# Patient Record
Sex: MALE | Race: WHITE | Employment: FULL TIME | URBAN - METROPOLITAN AREA
[De-identification: names, ages, dates, MRNs, and addresses within clinical notes are randomized per-mention and may not be internally consistent; named-entity substitution may affect disease eponyms.]

---

## 2020-11-24 ENCOUNTER — OFFICE VISIT (OUTPATIENT)
Dept: PRIMARY CARE CLINIC | Age: 25
End: 2020-11-24

## 2020-11-24 VITALS
TEMPERATURE: 98.4 F | RESPIRATION RATE: 18 BRPM | OXYGEN SATURATION: 96 % | HEIGHT: 67 IN | SYSTOLIC BLOOD PRESSURE: 110 MMHG | HEART RATE: 71 BPM | WEIGHT: 240 LBS | DIASTOLIC BLOOD PRESSURE: 72 MMHG | BODY MASS INDEX: 37.67 KG/M2

## 2020-11-24 DIAGNOSIS — Z20.822 EXPOSURE TO COVID-19 VIRUS: ICD-10-CM

## 2020-11-24 PROCEDURE — 99212 OFFICE O/P EST SF 10 MIN: CPT | Performed by: INTERNAL MEDICINE

## 2020-11-24 NOTE — PROGRESS NOTES
Chief Complaint   Headache (off and on x 2 days / girlfriend tested positive )    History of Present Illness   Source of history provided by:  patient. Marie Ann is a 26 y.o. old male who has a past medical history of:   Past Medical History:   Diagnosis Date    Asthma     Presents to the flu clinic with complaints of a sinus pressure x 3 days. States symptoms have been constant since onset. Denies SOB, cough of fever. Denies any CP, dyspnea, LE edema, abdominal pain, vomiting, rash, or lethargy. No OTC treatments. Denies any hx of asthma, COPD, or tobacco use. Denies any history of international travel in the past 14 days. Positive contact with individuals with known COVID-19 infection. Patient's girlfriend was tested for COVID on 11/18 and got a positive result on Sunday 11/22. Patient was tested on 11/22 and received a negative result from [facility]. He then developed a headache and sinus pressure and presents today for repeat testing. ROS   Pertinent positives and negatives are stated within HPI, all other systems reviewed and are negative. Past Surgical History:   Procedure Laterality Date    TONSILLECTOMY     Social History:  reports that he has never smoked. He has never used smokeless tobacco. He reports that he does not drink alcohol or use drugs. Family History: family history is not on file. Allergies: Patient has no known allergies. Physical Exam      VS:  /72   Pulse 71   Temp 98.4 °F (36.9 °C) (Temporal)   Resp 18   Ht 5' 7\" (1.702 m)   Wt 240 lb (108.9 kg)   SpO2 96%   BMI 37.59 kg/m²    Oxygen Saturation Interpretation: Normal.    Constitutional:  Alert, development consistent with age. NAD. Head:  NC/NT. Airway patent. Tenderness to palpation over maxillary and frontal sinuses. Ears: TMs normal bilaterally. Canals without exudate or swelling bilaterally. Mouth: Posterior pharynx with mild erythema and clear postnasal drip. no tonsillar hypertrophy or exudate. Neck:  Normal ROM. Supple. Lungs: CTAB without wheezes, rales, or rhonchi. CV:  Regular rate and rhythm, normal heart sounds, without pathological murmurs, ectopy, gallops, or rubs. Skin:  Normal turgor. Warm, dry, without visible rash. Lymphatic: No lymphangitis or adenopathy noted. Neurological:  Oriented. Motor functions intact. Lab / Imaging Results   (All laboratory and radiology results have been personally reviewed by myself)  Labs:  No results found for this visit on 11/24/20. Imaging: All Radiology results interpreted by Radiologist unless otherwise noted. No orders to display       Medical Decision Making   Pt non-toxic and stable at time of discharge. Assessment      1. Exposure to COVID-19 virus        Plan     Disposition:  Disposition: Discharge to home    COVID-19 swab obtained and pending, will call with results once available. Advised strict self-quarantine at home in the interim. Pt should remain out of work for at least 10 days from the start of symptoms. Pt should also be fever free for 24 hours and symptoms should be improved overall prior to returning to work. Work excuse provided to patient today. Increase fluids and rest. Symptomatic relief discussed including Tylenol prn pain/fever. Schedule virtual f/u with PCP in 7-10 days if symptoms persist. ED sooner if symptoms worsen or change. ED immediately with high or refractory fever, progressive SOB, dyspnea, CP, calf pain/swelling, shaking chills, vomiting, abdominal pain, lethargy, flank pain, or decreased urinary output. Pt states understanding and is in agreement with this care plan. All questions answered. This visit was provided as a focused evaluation during the COVID -19 pandemic/national emergency. A comprehensive review of all previous patient history and testing was not conducted. Pertinent findings were elicited during the visit.      Hafsa Woods DO  11/24/2020  12:35 PM

## 2020-11-26 LAB
SARS-COV-2: NOT DETECTED
SOURCE: NORMAL

## 2021-01-04 ENCOUNTER — OFFICE VISIT (OUTPATIENT)
Dept: PRIMARY CARE CLINIC | Age: 26
End: 2021-01-04
Payer: OTHER GOVERNMENT

## 2021-01-04 VITALS
OXYGEN SATURATION: 98 % | WEIGHT: 240 LBS | HEIGHT: 67 IN | BODY MASS INDEX: 37.67 KG/M2 | SYSTOLIC BLOOD PRESSURE: 116 MMHG | HEART RATE: 89 BPM | DIASTOLIC BLOOD PRESSURE: 74 MMHG | RESPIRATION RATE: 16 BRPM | TEMPERATURE: 97.4 F

## 2021-01-04 DIAGNOSIS — U07.1 COVID-19: Primary | ICD-10-CM

## 2021-01-04 LAB
Lab: ABNORMAL
QC PASS/FAIL: ABNORMAL
SARS-COV-2, POC: DETECTED

## 2021-01-04 PROCEDURE — 87426 SARSCOV CORONAVIRUS AG IA: CPT | Performed by: NURSE PRACTITIONER

## 2021-01-04 PROCEDURE — 99213 OFFICE O/P EST LOW 20 MIN: CPT | Performed by: NURSE PRACTITIONER

## 2021-01-04 RX ORDER — AZITHROMYCIN 250 MG/1
250 TABLET, FILM COATED ORAL DAILY
Qty: 6 TABLET | Refills: 0 | Status: SHIPPED
Start: 2021-01-04 | End: 2021-12-07

## 2021-01-04 RX ORDER — ZINC SULFATE 50(220)MG
50 CAPSULE ORAL DAILY
Qty: 7 CAPSULE | Refills: 0 | Status: SHIPPED
Start: 2021-01-04 | End: 2021-12-14

## 2021-01-04 NOTE — PROGRESS NOTES
MG tablet, Take 1 tablet by mouth 2 times daily for 7 days, Disp: 14 tablet, Rfl: 0    Allergies:   No Known Allergies    Social History:     Social History     Tobacco Use    Smoking status: Never Smoker    Smokeless tobacco: Never Used   Substance Use Topics    Alcohol use: No    Drug use: No       Patient lives at home. Physical Exam:     Vitals:    01/04/21 1317   BP: 116/74   Pulse: 89   Resp: 16   Temp: 97.4 °F (36.3 °C)   TempSrc: Temporal   SpO2: 98%   Weight: 240 lb (108.9 kg)   Height: 5' 7\" (1.702 m)       Physical Exam (PE)    Physical Exam  Constitutional:       Appearance: Normal appearance. HENT:      Head: Normocephalic. Right Ear: Tympanic membrane, ear canal and external ear normal.      Left Ear: Tympanic membrane, ear canal and external ear normal.      Nose: Congestion and rhinorrhea present. Mouth/Throat:      Mouth: Mucous membranes are moist.      Pharynx: Oropharynx is clear. Eyes:      Pupils: Pupils are equal, round, and reactive to light. Neck:      Musculoskeletal: Normal range of motion and neck supple. Cardiovascular:      Rate and Rhythm: Normal rate and regular rhythm. Pulses: Normal pulses. Heart sounds: Normal heart sounds. Pulmonary:      Effort: Pulmonary effort is normal.      Breath sounds: Normal breath sounds. No wheezing, rhonchi or rales. Abdominal:      General: Bowel sounds are normal.      Palpations: Abdomen is soft. Musculoskeletal: Normal range of motion. Lymphadenopathy:      Cervical: No cervical adenopathy. Skin:     General: Skin is warm and dry. Capillary Refill: Capillary refill takes less than 2 seconds. Neurological:      General: No focal deficit present. Mental Status: He is alert and oriented to person, place, and time.    Psychiatric:         Mood and Affect: Mood normal.         Behavior: Behavior normal.          Testing:   (All laboratory and radiology results have been personally reviewed by myself)  Labs:  Results for orders placed or performed in visit on 01/04/21   POCT COVID-19, Antigen   Result Value Ref Range    SARS-COV-2, POC Detected Not Detected    Lot Number [id number]     QC Pass/Fail pass        Imaging: All Radiology results interpreted by Radiologist unless otherwise noted. No orders to display       Assessment / Plan:   The patient's vitals, allergies, medications, and past medical history have been reviewed. Linwood Benjamin was seen today for nasal congestion, generalized body aches and headache. Diagnoses and all orders for this visit:    COVID-19  -     POCT COVID-19, Antigen  -     azithromycin (ZITHROMAX Z-CARL) 250 MG tablet; Take 1 tablet by mouth daily Take 2 tabs on day one, then 1 tab daily for the next 4 days  -     Ascorbic Acid (VITAMIN C) 500 MG CHEW; Take 1 tablet by mouth 2 times daily for 7 days  -     zinc sulfate (ZINC-220) 220 (50 Zn) MG capsule; Take 1 capsule by mouth daily for 7 days        - Discussed the benefits of daily vitamin C 1 g and zinc 50 mg for immune support. Discussed the use of Robitussin-DM as needed for cough and congestion.    -  Advised cautionary self-quarantine at home in the interim per CDC guidelines. Increase fluids and rest. Symptomatic relief discussed including Tylenol prn pain/fever. Schedule f/u with PCP in 2-3 days. Red flag symptoms were discussed with the patient today. The patient is directed to go the ED if symptoms worsen or change. Pt verbalizes understanding and is in agreement with plan of care. All questions answered. SIGNATURE: EMILY Almodovar-CNP    This visit was provided as a focused evaluation during the COVID -19 pandemic/national emergency. A comprehensive review of all previous patient history and testing was not conducted. Pertinent findings were elicited during the visit.

## 2021-01-04 NOTE — PATIENT INSTRUCTIONS
Patient Education        Learning About Coronavirus [phone])  Coronavirus [phone]): Overview  What is coronavirus ([id number])? The coronavirus disease (COVID-19) is caused by a virus. It is an illness that was first found in December 2019. It has since spread worldwide. The virus can cause fever, cough, and trouble breathing. In severe cases, it can cause pneumonia and make it hard to breathe without help. It can cause death. This virus spreads person-to-person through droplets from coughing and sneezing. It can also spread when you are close to someone who is infected. And it can spread when you touch something that has the virus on it, such as a doorknob or a tabletop. Coronaviruses are a large group of viruses. They cause the common cold. They also cause more serious illnesses like [place] respiratory syndrome (MERS) and severe acute respiratory syndrome (SARS). COVID-19 is caused by a novel coronavirus. That means it's a new type that has not been seen in people before. How is COVID-19 treated? Mild illness can be treated at home, but more serious illness needs to be treated in the hospital. Treatment may include medicines to reduce symptoms, plus breathing support such as oxygen therapy or a ventilator. Other treatments, such as antiviral medicines, may help people who have COVID-19. What can you do to protect yourself from COVID-19? The best way to protect yourself from getting sick is to:  · Avoid areas where there is an outbreak. · Avoid contact with people who may be infected. · Avoid crowds and try to stay at least 6 feet away from other people. · Wash your hands often, especially after you cough or sneeze. Use soap and water, and scrub for at least 20 seconds. If soap and water aren't available, use an alcohol-based hand . · Avoid touching your mouth, nose, and eyes. What can you do to avoid spreading the virus to others?   To help avoid spreading the virus to others:  · [employer] your hands often with soap or alcohol-based hand sanitizers. · Cover your mouth with a tissue when you cough or sneeze. Then throw the tissue in the trash. · Use a disinfectant to clean things that you touch often. These include doorknobs, remote controls, phones, and handles on your refrigerator and microwave. And don't forget countertops, tabletops, bathrooms, and computer keyboards. · Wear a cloth face cover if you have to go to public areas. If you know or suspect that you have COVID-19:  · Stay home. Don't go to school, work, or public areas. And don't use public transportation, ride-shares, or taxis unless you have no choice. · Leave your home only if you need to get medical care or testing. But call the doctor's office first so they know you're coming. And wear a face cover. · Limit contact with people in your home. If possible, stay in a separate bedroom and use a separate bathroom. · Wear a face cover whenever you're around other people. It can help stop the spread of the virus when you cough or sneeze. · Clean and disinfect your home every day. Use household  and disinfectant wipes or sprays. Take special care to clean things that you grab with your hands. · Self-isolate until it's safe to be around others again. ? If you have symptoms, it's safe when you haven't had a fever for 3 days and your symptoms have improved and it's been at least 10 days since your symptoms started. ? If you were exposed to the virus but don't have symptoms, it's safe to be around others 14 days after exposure. ? Talk to your doctor about whether you also need testing, especially if you have a weakened immune system. When to call for help  Call 911 anytime you think you may need emergency care. For example, call if:  · You have severe trouble breathing. (You can't talk at all.)  · You have constant chest pain or pressure. · You are severely dizzy or lightheaded.   · You are confused or can't think

## 2021-12-07 ENCOUNTER — ANESTHESIA EVENT (OUTPATIENT)
Dept: OPERATING ROOM | Age: 26
DRG: 906 | End: 2021-12-07
Payer: COMMERCIAL

## 2021-12-07 ENCOUNTER — HOSPITAL ENCOUNTER (INPATIENT)
Age: 26
LOS: 2 days | Discharge: HOME HEALTH | DRG: 906 | End: 2021-12-10
Attending: EMERGENCY MEDICINE | Admitting: ORTHOPAEDIC SURGERY
Payer: COMMERCIAL

## 2021-12-07 ENCOUNTER — APPOINTMENT (OUTPATIENT)
Dept: GENERAL RADIOLOGY | Age: 26
DRG: 906 | End: 2021-12-07
Payer: COMMERCIAL

## 2021-12-07 ENCOUNTER — ANESTHESIA (OUTPATIENT)
Dept: OPERATING ROOM | Age: 26
DRG: 906 | End: 2021-12-07
Payer: COMMERCIAL

## 2021-12-07 DIAGNOSIS — S68.011A AMPUTATION OF RIGHT THUMB WITH COMPLICATION, INITIAL ENCOUNTER: Primary | ICD-10-CM

## 2021-12-07 PROCEDURE — 3600000014 HC SURGERY LEVEL 4 ADDTL 15MIN: Performed by: ORTHOPAEDIC SURGERY

## 2021-12-07 PROCEDURE — 2580000003 HC RX 258: Performed by: ORTHOPAEDIC SURGERY

## 2021-12-07 PROCEDURE — 6360000002 HC RX W HCPCS: Performed by: EMERGENCY MEDICINE

## 2021-12-07 PROCEDURE — 90471 IMMUNIZATION ADMIN: CPT | Performed by: EMERGENCY MEDICINE

## 2021-12-07 PROCEDURE — 0X6L0Z0 DETACHMENT AT RIGHT THUMB, COMPLETE, OPEN APPROACH: ICD-10-PCS | Performed by: ORTHOPAEDIC SURGERY

## 2021-12-07 PROCEDURE — 2500000003 HC RX 250 WO HCPCS

## 2021-12-07 PROCEDURE — 3700000001 HC ADD 15 MINUTES (ANESTHESIA): Performed by: ORTHOPAEDIC SURGERY

## 2021-12-07 PROCEDURE — 2709999900 HC NON-CHARGEABLE SUPPLY: Performed by: ORTHOPAEDIC SURGERY

## 2021-12-07 PROCEDURE — 73130 X-RAY EXAM OF HAND: CPT

## 2021-12-07 PROCEDURE — 6360000002 HC RX W HCPCS

## 2021-12-07 PROCEDURE — 3600000004 HC SURGERY LEVEL 4 BASE: Performed by: ORTHOPAEDIC SURGERY

## 2021-12-07 PROCEDURE — 99222 1ST HOSP IP/OBS MODERATE 55: CPT | Performed by: ORTHOPAEDIC SURGERY

## 2021-12-07 PROCEDURE — 6360000002 HC RX W HCPCS: Performed by: ORTHOPAEDIC SURGERY

## 2021-12-07 PROCEDURE — 0PSR0ZZ REPOSITION RIGHT THUMB PHALANX, OPEN APPROACH: ICD-10-PCS | Performed by: ORTHOPAEDIC SURGERY

## 2021-12-07 PROCEDURE — 2580000003 HC RX 258: Performed by: EMERGENCY MEDICINE

## 2021-12-07 PROCEDURE — 3700000000 HC ANESTHESIA ATTENDED CARE: Performed by: ORTHOPAEDIC SURGERY

## 2021-12-07 PROCEDURE — 2580000003 HC RX 258

## 2021-12-07 PROCEDURE — 99283 EMERGENCY DEPT VISIT LOW MDM: CPT

## 2021-12-07 PROCEDURE — 90714 TD VACC NO PRESV 7 YRS+ IM: CPT | Performed by: EMERGENCY MEDICINE

## 2021-12-07 DEVICE — K WIRE FIX L6IN DIA0.045IN 1600645] MICROAIRE SURGICAL INSTRUMENTS INC]: Type: IMPLANTABLE DEVICE | Site: FINGERS | Status: FUNCTIONAL

## 2021-12-07 RX ORDER — DEXAMETHASONE SODIUM PHOSPHATE 10 MG/ML
INJECTION INTRAMUSCULAR; INTRAVENOUS PRN
Status: DISCONTINUED | OUTPATIENT
Start: 2021-12-07 | End: 2021-12-08 | Stop reason: SDUPTHER

## 2021-12-07 RX ORDER — ACETAMINOPHEN 650 MG
TABLET, EXTENDED RELEASE ORAL PRN
Status: DISCONTINUED | OUTPATIENT
Start: 2021-12-07 | End: 2021-12-08 | Stop reason: ALTCHOICE

## 2021-12-07 RX ORDER — PROMETHAZINE HYDROCHLORIDE 25 MG/ML
6.25 INJECTION, SOLUTION INTRAMUSCULAR; INTRAVENOUS
Status: ACTIVE | OUTPATIENT
Start: 2021-12-07 | End: 2021-12-07

## 2021-12-07 RX ORDER — CEFAZOLIN SODIUM 1 G/3ML
INJECTION, POWDER, FOR SOLUTION INTRAMUSCULAR; INTRAVENOUS PRN
Status: DISCONTINUED | OUTPATIENT
Start: 2021-12-07 | End: 2021-12-08 | Stop reason: SDUPTHER

## 2021-12-07 RX ORDER — TETANUS AND DIPHTHERIA TOXOIDS ADSORBED 2; 2 [LF]/.5ML; [LF]/.5ML
0.5 INJECTION INTRAMUSCULAR ONCE
Status: COMPLETED | OUTPATIENT
Start: 2021-12-07 | End: 2021-12-07

## 2021-12-07 RX ORDER — 0.9 % SODIUM CHLORIDE 0.9 %
1000 INTRAVENOUS SOLUTION INTRAVENOUS ONCE
Status: COMPLETED | OUTPATIENT
Start: 2021-12-07 | End: 2021-12-07

## 2021-12-07 RX ORDER — LABETALOL HYDROCHLORIDE 5 MG/ML
5 INJECTION, SOLUTION INTRAVENOUS EVERY 10 MIN PRN
Status: DISCONTINUED | OUTPATIENT
Start: 2021-12-07 | End: 2021-12-08 | Stop reason: HOSPADM

## 2021-12-07 RX ORDER — LIDOCAINE HYDROCHLORIDE 20 MG/ML
INJECTION, SOLUTION INTRAVENOUS PRN
Status: DISCONTINUED | OUTPATIENT
Start: 2021-12-07 | End: 2021-12-08 | Stop reason: SDUPTHER

## 2021-12-07 RX ORDER — HYDRALAZINE HYDROCHLORIDE 20 MG/ML
5 INJECTION INTRAMUSCULAR; INTRAVENOUS EVERY 10 MIN PRN
Status: DISCONTINUED | OUTPATIENT
Start: 2021-12-07 | End: 2021-12-08 | Stop reason: HOSPADM

## 2021-12-07 RX ORDER — PROPOFOL 10 MG/ML
INJECTION, EMULSION INTRAVENOUS PRN
Status: DISCONTINUED | OUTPATIENT
Start: 2021-12-07 | End: 2021-12-08 | Stop reason: SDUPTHER

## 2021-12-07 RX ORDER — FENTANYL CITRATE 50 UG/ML
INJECTION, SOLUTION INTRAMUSCULAR; INTRAVENOUS PRN
Status: DISCONTINUED | OUTPATIENT
Start: 2021-12-07 | End: 2021-12-08 | Stop reason: SDUPTHER

## 2021-12-07 RX ORDER — ROCURONIUM BROMIDE 10 MG/ML
INJECTION, SOLUTION INTRAVENOUS PRN
Status: DISCONTINUED | OUTPATIENT
Start: 2021-12-07 | End: 2021-12-08 | Stop reason: SDUPTHER

## 2021-12-07 RX ORDER — MIDAZOLAM HYDROCHLORIDE 1 MG/ML
INJECTION INTRAMUSCULAR; INTRAVENOUS PRN
Status: DISCONTINUED | OUTPATIENT
Start: 2021-12-07 | End: 2021-12-08 | Stop reason: SDUPTHER

## 2021-12-07 RX ORDER — SUCCINYLCHOLINE/SOD CL,ISO/PF 200MG/10ML
SYRINGE (ML) INTRAVENOUS PRN
Status: DISCONTINUED | OUTPATIENT
Start: 2021-12-07 | End: 2021-12-08 | Stop reason: SDUPTHER

## 2021-12-07 RX ORDER — MEPERIDINE HYDROCHLORIDE 25 MG/ML
12.5 INJECTION INTRAMUSCULAR; INTRAVENOUS; SUBCUTANEOUS EVERY 5 MIN PRN
Status: DISCONTINUED | OUTPATIENT
Start: 2021-12-07 | End: 2021-12-08 | Stop reason: HOSPADM

## 2021-12-07 RX ORDER — SODIUM CHLORIDE 9 MG/ML
INJECTION, SOLUTION INTRAVENOUS CONTINUOUS PRN
Status: DISCONTINUED | OUTPATIENT
Start: 2021-12-07 | End: 2021-12-08 | Stop reason: SDUPTHER

## 2021-12-07 RX ADMIN — FENTANYL CITRATE 150 MCG: 50 INJECTION, SOLUTION INTRAMUSCULAR; INTRAVENOUS at 21:51

## 2021-12-07 RX ADMIN — FENTANYL CITRATE 100 MCG: 50 INJECTION, SOLUTION INTRAMUSCULAR; INTRAVENOUS at 21:44

## 2021-12-07 RX ADMIN — LIDOCAINE HYDROCHLORIDE 100 MG: 20 INJECTION, SOLUTION INTRAVENOUS at 21:44

## 2021-12-07 RX ADMIN — MIDAZOLAM 2 MG: 1 INJECTION INTRAMUSCULAR; INTRAVENOUS at 21:41

## 2021-12-07 RX ADMIN — CEFAZOLIN 2000 MG: 1 INJECTION, POWDER, FOR SOLUTION INTRAMUSCULAR; INTRAVENOUS at 21:51

## 2021-12-07 RX ADMIN — DEXAMETHASONE SODIUM PHOSPHATE 10 MG: 10 INJECTION INTRAMUSCULAR; INTRAVENOUS at 21:44

## 2021-12-07 RX ADMIN — Medication 160 MG: at 21:44

## 2021-12-07 RX ADMIN — PROPOFOL 150 MG: 10 INJECTION, EMULSION INTRAVENOUS at 21:44

## 2021-12-07 RX ADMIN — SODIUM CHLORIDE 1000 ML: 9 INJECTION, SOLUTION INTRAVENOUS at 21:00

## 2021-12-07 RX ADMIN — TETANUS AND DIPHTHERIA TOXOIDS ADSORBED 0.5 ML: 2; 2 INJECTION INTRAMUSCULAR at 20:31

## 2021-12-07 RX ADMIN — SODIUM CHLORIDE: 9 INJECTION, SOLUTION INTRAVENOUS at 21:41

## 2021-12-07 RX ADMIN — ROCURONIUM BROMIDE 5 MG: 10 INJECTION, SOLUTION INTRAVENOUS at 21:44

## 2021-12-07 RX ADMIN — ROCURONIUM BROMIDE 30 MG: 10 INJECTION, SOLUTION INTRAVENOUS at 21:52

## 2021-12-07 ASSESSMENT — PULMONARY FUNCTION TESTS
PIF_VALUE: 20
PIF_VALUE: 21
PIF_VALUE: 20
PIF_VALUE: 21
PIF_VALUE: 20
PIF_VALUE: 20
PIF_VALUE: 22
PIF_VALUE: 21
PIF_VALUE: 21
PIF_VALUE: 20
PIF_VALUE: 20
PIF_VALUE: 21
PIF_VALUE: 20
PIF_VALUE: 20
PIF_VALUE: 21
PIF_VALUE: 21
PIF_VALUE: 20
PIF_VALUE: 21
PIF_VALUE: 20
PIF_VALUE: 21
PIF_VALUE: 20
PIF_VALUE: 19
PIF_VALUE: 21
PIF_VALUE: 20
PIF_VALUE: 20
PIF_VALUE: 21
PIF_VALUE: 20
PIF_VALUE: 21
PIF_VALUE: 20
PIF_VALUE: 21
PIF_VALUE: 21
PIF_VALUE: 20
PIF_VALUE: 20
PIF_VALUE: 21
PIF_VALUE: 21
PIF_VALUE: 20
PIF_VALUE: 21
PIF_VALUE: 22
PIF_VALUE: 20
PIF_VALUE: 18
PIF_VALUE: 21
PIF_VALUE: 20
PIF_VALUE: 20
PIF_VALUE: 0
PIF_VALUE: 21
PIF_VALUE: 20
PIF_VALUE: 21
PIF_VALUE: 21
PIF_VALUE: 20
PIF_VALUE: 20
PIF_VALUE: 21
PIF_VALUE: 21
PIF_VALUE: 20
PIF_VALUE: 21
PIF_VALUE: 20
PIF_VALUE: 21
PIF_VALUE: 21
PIF_VALUE: 20
PIF_VALUE: 20
PIF_VALUE: 21
PIF_VALUE: 20
PIF_VALUE: 21
PIF_VALUE: 1
PIF_VALUE: 20
PIF_VALUE: 20
PIF_VALUE: 21
PIF_VALUE: 3
PIF_VALUE: 20
PIF_VALUE: 1
PIF_VALUE: 22
PIF_VALUE: 20
PIF_VALUE: 21
PIF_VALUE: 20
PIF_VALUE: 21
PIF_VALUE: 20
PIF_VALUE: 20
PIF_VALUE: 21
PIF_VALUE: 20
PIF_VALUE: 21
PIF_VALUE: 20
PIF_VALUE: 21
PIF_VALUE: 21
PIF_VALUE: 20
PIF_VALUE: 21
PIF_VALUE: 20
PIF_VALUE: 21
PIF_VALUE: 20
PIF_VALUE: 21
PIF_VALUE: 20
PIF_VALUE: 21
PIF_VALUE: 1
PIF_VALUE: 21
PIF_VALUE: 20
PIF_VALUE: 21

## 2021-12-07 ASSESSMENT — PAIN DESCRIPTION - FREQUENCY: FREQUENCY: CONTINUOUS

## 2021-12-07 ASSESSMENT — PAIN DESCRIPTION - PAIN TYPE: TYPE: ACUTE PAIN

## 2021-12-07 ASSESSMENT — PAIN SCALES - WONG BAKER: WONGBAKER_NUMERICALRESPONSE: 2

## 2021-12-07 ASSESSMENT — PAIN DESCRIPTION - DESCRIPTORS: DESCRIPTORS: DISCOMFORT

## 2021-12-07 ASSESSMENT — LIFESTYLE VARIABLES: SMOKING_STATUS: 0

## 2021-12-08 ENCOUNTER — APPOINTMENT (OUTPATIENT)
Dept: GENERAL RADIOLOGY | Age: 26
DRG: 906 | End: 2021-12-08
Payer: COMMERCIAL

## 2021-12-08 VITALS — DIASTOLIC BLOOD PRESSURE: 104 MMHG | TEMPERATURE: 97 F | SYSTOLIC BLOOD PRESSURE: 164 MMHG | OXYGEN SATURATION: 100 %

## 2021-12-08 PROBLEM — S68.011A: Status: ACTIVE | Noted: 2021-12-08

## 2021-12-08 LAB
ALBUMIN SERPL-MCNC: 3.6 G/DL (ref 3.5–5.2)
ALP BLD-CCNC: 46 U/L (ref 40–129)
ALT SERPL-CCNC: 19 U/L (ref 0–40)
ANION GAP SERPL CALCULATED.3IONS-SCNC: 14 MMOL/L (ref 7–16)
APTT: 44.2 SEC (ref 24.5–35.1)
APTT: 46.7 SEC (ref 24.5–35.1)
APTT: 61.7 SEC (ref 24.5–35.1)
APTT: >240 SEC (ref 24.5–35.1)
AST SERPL-CCNC: 14 U/L (ref 0–39)
BILIRUB SERPL-MCNC: 0.7 MG/DL (ref 0–1.2)
BUN BLDV-MCNC: 10 MG/DL (ref 6–20)
CALCIUM IONIZED: 1.26 MMOL/L (ref 1.15–1.33)
CALCIUM SERPL-MCNC: 8.3 MG/DL (ref 8.6–10.2)
CHLORIDE BLD-SCNC: 106 MMOL/L (ref 98–107)
CO2: 18 MMOL/L (ref 22–29)
CREAT SERPL-MCNC: 0.7 MG/DL (ref 0.7–1.2)
GFR AFRICAN AMERICAN: >60
GFR NON-AFRICAN AMERICAN: >60 ML/MIN/1.73
GLUCOSE BLD-MCNC: 133 MG/DL (ref 74–99)
HCT VFR BLD CALC: 36.1 % (ref 37–54)
HEMOGLOBIN: 12.6 G/DL (ref 12.5–16.5)
MAGNESIUM: 1.6 MG/DL (ref 1.6–2.6)
MCH RBC QN AUTO: 30.4 PG (ref 26–35)
MCHC RBC AUTO-ENTMCNC: 34.9 % (ref 32–34.5)
MCV RBC AUTO: 87.2 FL (ref 80–99.9)
PDW BLD-RTO: 12 FL (ref 11.5–15)
PHOSPHORUS: 1.8 MG/DL (ref 2.5–4.5)
PLATELET # BLD: 239 E9/L (ref 130–450)
PMV BLD AUTO: 9.8 FL (ref 7–12)
POTASSIUM SERPL-SCNC: 3.6 MMOL/L (ref 3.5–5)
RBC # BLD: 4.14 E12/L (ref 3.8–5.8)
REASON FOR REJECTION: NORMAL
REASON FOR REJECTION: NORMAL
REJECTED TEST: NORMAL
REJECTED TEST: NORMAL
SARS-COV-2, NAAT: NOT DETECTED
SODIUM BLD-SCNC: 138 MMOL/L (ref 132–146)
TOTAL PROTEIN: 6.2 G/DL (ref 6.4–8.3)
WBC # BLD: 12.3 E9/L (ref 4.5–11.5)

## 2021-12-08 PROCEDURE — 84100 ASSAY OF PHOSPHORUS: CPT

## 2021-12-08 PROCEDURE — 6360000002 HC RX W HCPCS

## 2021-12-08 PROCEDURE — 6360000002 HC RX W HCPCS: Performed by: STUDENT IN AN ORGANIZED HEALTH CARE EDUCATION/TRAINING PROGRAM

## 2021-12-08 PROCEDURE — 20827 REPLANTATION THUMB COMPLETE: CPT | Performed by: ORTHOPAEDIC SURGERY

## 2021-12-08 PROCEDURE — 7100000001 HC PACU RECOVERY - ADDTL 15 MIN

## 2021-12-08 PROCEDURE — 36415 COLL VENOUS BLD VENIPUNCTURE: CPT

## 2021-12-08 PROCEDURE — 7100000000 HC PACU RECOVERY - FIRST 15 MIN

## 2021-12-08 PROCEDURE — 87635 SARS-COV-2 COVID-19 AMP PRB: CPT

## 2021-12-08 PROCEDURE — 82330 ASSAY OF CALCIUM: CPT

## 2021-12-08 PROCEDURE — 2500000003 HC RX 250 WO HCPCS: Performed by: STUDENT IN AN ORGANIZED HEALTH CARE EDUCATION/TRAINING PROGRAM

## 2021-12-08 PROCEDURE — 80053 COMPREHEN METABOLIC PANEL: CPT

## 2021-12-08 PROCEDURE — 6360000002 HC RX W HCPCS: Performed by: ANESTHESIOLOGIST ASSISTANT

## 2021-12-08 PROCEDURE — 85027 COMPLETE CBC AUTOMATED: CPT

## 2021-12-08 PROCEDURE — 85730 THROMBOPLASTIN TIME PARTIAL: CPT

## 2021-12-08 PROCEDURE — 2580000003 HC RX 258: Performed by: STUDENT IN AN ORGANIZED HEALTH CARE EDUCATION/TRAINING PROGRAM

## 2021-12-08 PROCEDURE — 83735 ASSAY OF MAGNESIUM: CPT

## 2021-12-08 PROCEDURE — 2500000003 HC RX 250 WO HCPCS: Performed by: ANESTHESIOLOGIST ASSISTANT

## 2021-12-08 PROCEDURE — 3209999900 FLUORO FOR SURGICAL PROCEDURES

## 2021-12-08 PROCEDURE — 2000000000 HC ICU R&B

## 2021-12-08 PROCEDURE — 6370000000 HC RX 637 (ALT 250 FOR IP): Performed by: STUDENT IN AN ORGANIZED HEALTH CARE EDUCATION/TRAINING PROGRAM

## 2021-12-08 RX ORDER — ONDANSETRON 2 MG/ML
4 INJECTION INTRAMUSCULAR; INTRAVENOUS EVERY 6 HOURS PRN
Status: DISCONTINUED | OUTPATIENT
Start: 2021-12-08 | End: 2021-12-10 | Stop reason: HOSPADM

## 2021-12-08 RX ORDER — ONDANSETRON 2 MG/ML
INJECTION INTRAMUSCULAR; INTRAVENOUS
Status: COMPLETED
Start: 2021-12-08 | End: 2021-12-08

## 2021-12-08 RX ORDER — NEOSTIGMINE METHYLSULFATE 1 MG/ML
INJECTION, SOLUTION INTRAVENOUS PRN
Status: DISCONTINUED | OUTPATIENT
Start: 2021-12-08 | End: 2021-12-08 | Stop reason: SDUPTHER

## 2021-12-08 RX ORDER — OXYCODONE HYDROCHLORIDE 10 MG/1
10 TABLET ORAL EVERY 4 HOURS PRN
Status: DISCONTINUED | OUTPATIENT
Start: 2021-12-08 | End: 2021-12-10

## 2021-12-08 RX ORDER — ACETAMINOPHEN 325 MG/1
650 TABLET ORAL EVERY 6 HOURS
Status: DISCONTINUED | OUTPATIENT
Start: 2021-12-08 | End: 2021-12-09

## 2021-12-08 RX ORDER — HEPARIN SODIUM 10000 [USP'U]/100ML
5-30 INJECTION, SOLUTION INTRAVENOUS CONTINUOUS
Status: DISCONTINUED | OUTPATIENT
Start: 2021-12-08 | End: 2021-12-10

## 2021-12-08 RX ORDER — HEPARIN SODIUM 1000 [USP'U]/ML
INJECTION, SOLUTION INTRAVENOUS; SUBCUTANEOUS PRN
Status: DISCONTINUED | OUTPATIENT
Start: 2021-12-08 | End: 2021-12-08 | Stop reason: SDUPTHER

## 2021-12-08 RX ORDER — SODIUM CHLORIDE 0.9 % (FLUSH) 0.9 %
10 SYRINGE (ML) INJECTION PRN
Status: DISCONTINUED | OUTPATIENT
Start: 2021-12-08 | End: 2021-12-10 | Stop reason: HOSPADM

## 2021-12-08 RX ORDER — OXYCODONE HYDROCHLORIDE 5 MG/1
5 TABLET ORAL EVERY 4 HOURS PRN
Status: DISCONTINUED | OUTPATIENT
Start: 2021-12-08 | End: 2021-12-10

## 2021-12-08 RX ORDER — HEPARIN SODIUM 1000 [USP'U]/ML
4000 INJECTION, SOLUTION INTRAVENOUS; SUBCUTANEOUS ONCE
Status: DISCONTINUED | OUTPATIENT
Start: 2021-12-08 | End: 2021-12-08 | Stop reason: ALTCHOICE

## 2021-12-08 RX ORDER — ONDANSETRON 4 MG/1
4 TABLET, ORALLY DISINTEGRATING ORAL EVERY 8 HOURS PRN
Status: DISCONTINUED | OUTPATIENT
Start: 2021-12-08 | End: 2021-12-10 | Stop reason: HOSPADM

## 2021-12-08 RX ORDER — SODIUM CHLORIDE 9 MG/ML
25 INJECTION, SOLUTION INTRAVENOUS PRN
Status: DISCONTINUED | OUTPATIENT
Start: 2021-12-08 | End: 2021-12-10 | Stop reason: HOSPADM

## 2021-12-08 RX ORDER — POLYETHYLENE GLYCOL 3350 17 G/17G
17 POWDER, FOR SOLUTION ORAL DAILY
Status: DISCONTINUED | OUTPATIENT
Start: 2021-12-08 | End: 2021-12-09

## 2021-12-08 RX ORDER — ONDANSETRON 2 MG/ML
INJECTION INTRAMUSCULAR; INTRAVENOUS PRN
Status: DISCONTINUED | OUTPATIENT
Start: 2021-12-08 | End: 2021-12-08 | Stop reason: SDUPTHER

## 2021-12-08 RX ORDER — GLYCOPYRROLATE 1 MG/5 ML
SYRINGE (ML) INTRAVENOUS PRN
Status: DISCONTINUED | OUTPATIENT
Start: 2021-12-08 | End: 2021-12-08 | Stop reason: SDUPTHER

## 2021-12-08 RX ORDER — SODIUM CHLORIDE, SODIUM LACTATE, POTASSIUM CHLORIDE, CALCIUM CHLORIDE 600; 310; 30; 20 MG/100ML; MG/100ML; MG/100ML; MG/100ML
INJECTION, SOLUTION INTRAVENOUS CONTINUOUS
Status: DISCONTINUED | OUTPATIENT
Start: 2021-12-08 | End: 2021-12-09

## 2021-12-08 RX ORDER — HEPARIN SODIUM 1000 [USP'U]/ML
2000 INJECTION, SOLUTION INTRAVENOUS; SUBCUTANEOUS PRN
Status: DISCONTINUED | OUTPATIENT
Start: 2021-12-08 | End: 2021-12-10

## 2021-12-08 RX ORDER — MAGNESIUM SULFATE IN WATER 40 MG/ML
4000 INJECTION, SOLUTION INTRAVENOUS ONCE
Status: COMPLETED | OUTPATIENT
Start: 2021-12-08 | End: 2021-12-08

## 2021-12-08 RX ORDER — HEPARIN SODIUM 1000 [USP'U]/ML
4000 INJECTION, SOLUTION INTRAVENOUS; SUBCUTANEOUS PRN
Status: DISCONTINUED | OUTPATIENT
Start: 2021-12-08 | End: 2021-12-10

## 2021-12-08 RX ORDER — SODIUM CHLORIDE 0.9 % (FLUSH) 0.9 %
10 SYRINGE (ML) INJECTION EVERY 12 HOURS SCHEDULED
Status: DISCONTINUED | OUTPATIENT
Start: 2021-12-08 | End: 2021-12-10 | Stop reason: HOSPADM

## 2021-12-08 RX ORDER — HYDRALAZINE HYDROCHLORIDE 20 MG/ML
10 INJECTION INTRAMUSCULAR; INTRAVENOUS EVERY 30 MIN PRN
Status: DISCONTINUED | OUTPATIENT
Start: 2021-12-08 | End: 2021-12-10 | Stop reason: HOSPADM

## 2021-12-08 RX ORDER — SENNA PLUS 8.6 MG/1
1 TABLET ORAL DAILY PRN
Status: DISCONTINUED | OUTPATIENT
Start: 2021-12-08 | End: 2021-12-10 | Stop reason: HOSPADM

## 2021-12-08 RX ORDER — LABETALOL HYDROCHLORIDE 5 MG/ML
10 INJECTION, SOLUTION INTRAVENOUS EVERY 30 MIN PRN
Status: DISCONTINUED | OUTPATIENT
Start: 2021-12-08 | End: 2021-12-10 | Stop reason: HOSPADM

## 2021-12-08 RX ORDER — OXYCODONE HYDROCHLORIDE AND ACETAMINOPHEN 5; 325 MG/1; MG/1
1 TABLET ORAL EVERY 6 HOURS PRN
Qty: 28 TABLET | Refills: 0 | Status: SHIPPED | OUTPATIENT
Start: 2021-12-08 | End: 2021-12-10 | Stop reason: HOSPADM

## 2021-12-08 RX ADMIN — Medication 0.4 MG: at 00:57

## 2021-12-08 RX ADMIN — SODIUM PHOSPHATE, MONOBASIC, MONOHYDRATE 20 MMOL: 276; 142 INJECTION, SOLUTION INTRAVENOUS at 17:27

## 2021-12-08 RX ADMIN — HYDROMORPHONE HYDROCHLORIDE 0.5 MG: 1 INJECTION, SOLUTION INTRAMUSCULAR; INTRAVENOUS; SUBCUTANEOUS at 17:37

## 2021-12-08 RX ADMIN — CEFAZOLIN SODIUM 1000 MG: 1 INJECTION, POWDER, FOR SOLUTION INTRAMUSCULAR; INTRAVENOUS at 03:52

## 2021-12-08 RX ADMIN — HEPARIN SODIUM 13 UNITS/KG/HR: 10000 INJECTION, SOLUTION INTRAVENOUS at 20:20

## 2021-12-08 RX ADMIN — SODIUM CHLORIDE, POTASSIUM CHLORIDE, SODIUM LACTATE AND CALCIUM CHLORIDE: 600; 310; 30; 20 INJECTION, SOLUTION INTRAVENOUS at 01:49

## 2021-12-08 RX ADMIN — OXYCODONE HYDROCHLORIDE 10 MG: 10 TABLET ORAL at 02:39

## 2021-12-08 RX ADMIN — HEPARIN SODIUM 2000 UNITS: 1000 INJECTION INTRAVENOUS; SUBCUTANEOUS at 11:02

## 2021-12-08 RX ADMIN — MAGNESIUM SULFATE HEPTAHYDRATE 4000 MG: 40 INJECTION, SOLUTION INTRAVENOUS at 17:27

## 2021-12-08 RX ADMIN — ACETAMINOPHEN 650 MG: 325 TABLET ORAL at 05:44

## 2021-12-08 RX ADMIN — ACETAMINOPHEN 650 MG: 325 TABLET ORAL at 11:55

## 2021-12-08 RX ADMIN — OXYCODONE HYDROCHLORIDE 10 MG: 10 TABLET ORAL at 16:31

## 2021-12-08 RX ADMIN — OXYCODONE HYDROCHLORIDE 10 MG: 10 TABLET ORAL at 07:45

## 2021-12-08 RX ADMIN — CEFAZOLIN SODIUM 1000 MG: 1 INJECTION, POWDER, FOR SOLUTION INTRAMUSCULAR; INTRAVENOUS at 10:35

## 2021-12-08 RX ADMIN — ONDANSETRON HYDROCHLORIDE 4 MG: 2 SOLUTION INTRAMUSCULAR; INTRAVENOUS at 00:57

## 2021-12-08 RX ADMIN — HYDROMORPHONE HYDROCHLORIDE 0.5 MG: 1 INJECTION, SOLUTION INTRAMUSCULAR; INTRAVENOUS; SUBCUTANEOUS at 22:32

## 2021-12-08 RX ADMIN — HEPARIN SODIUM 11.2 UNITS/KG/HR: 10000 INJECTION, SOLUTION INTRAVENOUS at 03:07

## 2021-12-08 RX ADMIN — FENTANYL CITRATE 100 MCG: 50 INJECTION, SOLUTION INTRAMUSCULAR; INTRAVENOUS at 00:53

## 2021-12-08 RX ADMIN — ONDANSETRON 4 MG: 2 INJECTION INTRAMUSCULAR; INTRAVENOUS at 01:49

## 2021-12-08 RX ADMIN — HEPARIN SODIUM 5000 UNITS: 1000 INJECTION INTRAVENOUS; SUBCUTANEOUS at 00:22

## 2021-12-08 RX ADMIN — OXYCODONE HYDROCHLORIDE 10 MG: 10 TABLET ORAL at 11:55

## 2021-12-08 RX ADMIN — Medication 3 MG: at 00:57

## 2021-12-08 RX ADMIN — HEPARIN SODIUM 2000 UNITS: 1000 INJECTION INTRAVENOUS; SUBCUTANEOUS at 03:09

## 2021-12-08 RX ADMIN — HYDROMORPHONE HYDROCHLORIDE 0.5 MG: 1 INJECTION, SOLUTION INTRAMUSCULAR; INTRAVENOUS; SUBCUTANEOUS at 10:31

## 2021-12-08 RX ADMIN — ACETAMINOPHEN 650 MG: 325 TABLET ORAL at 18:16

## 2021-12-08 RX ADMIN — OXYCODONE HYDROCHLORIDE 10 MG: 10 TABLET ORAL at 21:04

## 2021-12-08 ASSESSMENT — PULMONARY FUNCTION TESTS
PIF_VALUE: 15
PIF_VALUE: 21
PIF_VALUE: 2
PIF_VALUE: 21
PIF_VALUE: 11
PIF_VALUE: 2
PIF_VALUE: 15
PIF_VALUE: 21
PIF_VALUE: 2
PIF_VALUE: 21
PIF_VALUE: 21
PIF_VALUE: 12
PIF_VALUE: 2
PIF_VALUE: 21
PIF_VALUE: 15
PIF_VALUE: 21
PIF_VALUE: 5
PIF_VALUE: 21
PIF_VALUE: 15
PIF_VALUE: 21
PIF_VALUE: 21
PIF_VALUE: 16
PIF_VALUE: 21
PIF_VALUE: 2
PIF_VALUE: 21
PIF_VALUE: 16
PIF_VALUE: 10
PIF_VALUE: 3
PIF_VALUE: 21
PIF_VALUE: 15
PIF_VALUE: 11
PIF_VALUE: 2
PIF_VALUE: 21
PIF_VALUE: 16
PIF_VALUE: 15
PIF_VALUE: 2
PIF_VALUE: 11
PIF_VALUE: 21
PIF_VALUE: 11
PIF_VALUE: 21
PIF_VALUE: 15
PIF_VALUE: 1
PIF_VALUE: 3
PIF_VALUE: 2
PIF_VALUE: 15
PIF_VALUE: 21
PIF_VALUE: 21
PIF_VALUE: 15
PIF_VALUE: 10
PIF_VALUE: 21
PIF_VALUE: 8
PIF_VALUE: 21
PIF_VALUE: 3
PIF_VALUE: 15
PIF_VALUE: 15
PIF_VALUE: 21
PIF_VALUE: 21
PIF_VALUE: 6
PIF_VALUE: 11
PIF_VALUE: 15
PIF_VALUE: 1
PIF_VALUE: 21
PIF_VALUE: 16
PIF_VALUE: 2
PIF_VALUE: 21
PIF_VALUE: 1
PIF_VALUE: 2
PIF_VALUE: 21
PIF_VALUE: 2
PIF_VALUE: 15
PIF_VALUE: 21
PIF_VALUE: 21

## 2021-12-08 ASSESSMENT — PAIN SCALES - GENERAL
PAINLEVEL_OUTOF10: 4
PAINLEVEL_OUTOF10: 7
PAINLEVEL_OUTOF10: 4
PAINLEVEL_OUTOF10: 9
PAINLEVEL_OUTOF10: 7
PAINLEVEL_OUTOF10: 5
PAINLEVEL_OUTOF10: 9
PAINLEVEL_OUTOF10: 9
PAINLEVEL_OUTOF10: 5
PAINLEVEL_OUTOF10: 8
PAINLEVEL_OUTOF10: 8
PAINLEVEL_OUTOF10: 4
PAINLEVEL_OUTOF10: 4
PAINLEVEL_OUTOF10: 8

## 2021-12-08 ASSESSMENT — PAIN DESCRIPTION - LOCATION
LOCATION: HAND
LOCATION: HAND

## 2021-12-08 ASSESSMENT — PAIN DESCRIPTION - ORIENTATION
ORIENTATION: RIGHT
ORIENTATION: RIGHT

## 2021-12-08 ASSESSMENT — PAIN DESCRIPTION - DESCRIPTORS: DESCRIPTORS: SORE;SHARP;THROBBING

## 2021-12-08 ASSESSMENT — PAIN DESCRIPTION - FREQUENCY
FREQUENCY: CONTINUOUS
FREQUENCY: CONTINUOUS

## 2021-12-08 ASSESSMENT — PAIN DESCRIPTION - PAIN TYPE
TYPE: ACUTE PAIN
TYPE: ACUTE PAIN

## 2021-12-08 ASSESSMENT — PAIN DESCRIPTION - ONSET: ONSET: ON-GOING

## 2021-12-08 NOTE — H&P
Department of Orthopedic Surgery  Resident Consult Note              Reason for Consult:  Traumatic R thumb amputation     HISTORY OF PRESENT ILLNESS:                   Patient is a 23 y.o. male who presents as a transfer from [facility] for atraumatic right thumb amputation. Patient states that he was working and cleaning a razor blade like saw when it spun off and cut off part of his finger. He noted immediate pain and deformity to the affected digit. He immediately rinsed his hand in alcohol and put the amputated part of his right thumb into a plastic bag in order to take the emergency department. This amputated part of the finger was then placed into an eye solution soaked in saline soaked gauze. The patient is wishing to proceed with replantation if possible.     Denies numbness/tingling/paresthesias. Denies any other orthopedic complaints at this time.       Past Medical History:    Past Medical History             Diagnosis Date    Asthma           Past Surgical History:    Past Surgical History       Procedure Laterality Date    TONSILLECTOMY             Current Medications:   Current Hospital Medications   Current Facility-Administered Medications: 0.9 % sodium chloride bolus, 1,000 mL, IntraVENous, Once     Allergies:  Patient has no known allergies.     Social History:   TOBACCO:   reports that he has never smoked. He has never used smokeless tobacco.  ETOH:   reports no history of alcohol use. DRUGS:   reports no history of drug use. ACTIVITIES OF DAILY LIVING:    OCCUPATION:    Family History:   Family History   History reviewed.  No pertinent family history.        REVIEW OF SYSTEMS:  CONSTITUTIONAL:  negative for  fevers, chills  EYES:  negative for acute vision changes  HEENT:  negative for cough, hearing loss  RESPIRATORY:  negative for  dyspnea, wheezing  CARDIOVASCULAR:  negative for  chest pain  GASTROINTESTINAL:  negative for nausea, vomiting  GENITOURINARY:  negative for frequency, urinary incontinence  HEMATOLOGIC/LYMPHATIC:  negative for bleeding  MUSCULOSKELETAL:  positive for  bone pain and amputation  NEUROLOGICAL:  negative for headaches, dizziness  BEHAVIOR/PSYCH:  negative for increased agitation and anxiety     PHYSICAL EXAM:    VITALS:  BP (!) 94/50   Pulse 84   Temp 98.3 °F (36.8 °C)   Resp 16   Ht 5' 7\" (1.702 m)   Wt 240 lb (108.9 kg)   SpO2 100%   BMI 37.59 kg/m²   CONSTITUTIONAL:  awake, alert, cooperative, anxious, and appears stated age  MUSCULOSKELETAL:  Right upper Extremity:  · Skin there is a transverse laceration running from the radial border of the thumb to the ulnar border of the thumb at the distal aspect of the proximal phalanx running to the distal phalanx. There is exposed bone, tendon, with arterial bleeding present. The amputated digit was removed from saline for examination and x-rays. There is a linear laceration without significant soft tissue disturbances with exposed tendon and bone. · TTP about the right thumb no tenderness palpation about the rest of the hand, fingers, wrist, arm, elbow, humerus, shoulder, clavicle. · Sensation intact to light touch on the radial and ulnar borders of the thumb proximal to the amputation. Sensation of the remainder of the hand is grossly intact to radial, median, ulnar nerve distributions. · Motor function grossly intact distally in AIN, PIN, radial, median, ulnar nerve distributions  · Compartments soft and compression  · +2/4 radial and ulnar pulses     Secondary Exam:   · leftUE: No obvious signs of trauma. -TTP to fingers, hand, wrist, forearm, elbow, humerus, shoulder or clavicle.     · bilateralLE: No obvious signs of trauma.    -TTP to foot, ankle, leg, knee, thigh, hip.     · Pelvis: -TTP, -Log roll, -Heel strike      DATA:    CBC:         Lab Results   Component Value Date     WBC 8.0 10/02/2017     RBC 5.09 10/02/2017     HGB 15.4 10/02/2017     HCT 43.0 10/02/2017     MCV 84.5 10/02/2017     MCH 30.3 10/02/2017     MCHC 35.8 10/02/2017     RDW 11.5 10/02/2017      10/02/2017     MPV 8.7 10/02/2017      PT/INR:          Lab Results   Component Value Date     PROTIME 10.9 10/02/2017     INR 1.0 10/02/2017         Radiology Review:  XR 3 views of the right hand reviewed demonstrating truamatic amputation through the level of the interphalangeal joint. No other fractures or dislocations noted. No other bony or soft tissue abnormalities noted.     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 Desire Galdamez  · Admission following surgery  · Pain control following surgery  · Antibiotics perioperatively for surgical prophylaxis  · DVT Prophylaxis following surgery  · Plan for stat replantation of traumatic right thumb amputation  · All patient/family questions have been answered and patient is currently agreeable to plan. · Discuss with Attending Dr. Perla Anna  Electronically signed by Power Womack DO on 12/7/2021 at 8:50 PM         I have seen and evaluated the patient and agree with the above assessment and plan on today's visit. I have performed the key components of the history and physical examination with significant findings of right dominant thumb oblique amputation of thumb at distal phalanx/interphalageal joint level. Disucssed salvage replantation vs revision amputation vs flap coverage in detail. All options explained and discussed. Plan for emergent surgery for exploration and replantation vs revision amputation/flap coverage.  I concur with the findings and plan as documented.        I explained the risks, benefits, alternatives and complications of surgery with the patient including but not limited to the risks of infection, possible damage to nerves, vessels, or tendons, stiffness, loss of range of motion, replantation failure, phantom thumb pain/amputation site pain, scar sensitivity, wound healing complications, worsening symptoms, possible need for therapy, as well as the possible need further surgery and unanticipated complications. The patient voiced understanding and all questions were answered.  The patient elected to proceed with emergent surgical intervention.         Rajni Diallo MD

## 2021-12-08 NOTE — PROGRESS NOTES
Department of Orthopedic Surgery  Resident Progress Note    Patient seen and examined. Pain controlled. No new complaints. Denies chest pain, shortness of breath, dizziness/lightheadedness. VITALS:  /75   Pulse 76   Temp 98.2 °F (36.8 °C) (Oral)   Resp 24   Ht 5' 6\" (1.676 m)   Wt 236 lb 6.4 oz (107.2 kg)   SpO2 97%   BMI 38.16 kg/m²     General: alert and oriented to person, place and time, well-developed and well-nourished, in no acute distress    MUSCULOSKELETAL:   right upper extremity:  · Dressing C/D/I. Dressing taken down, some saturation deep within the dressing, most prominent over the incisional sites. · Compartments soft and compressible  · +AIN/PIN/Ulnar/Median/Radial nerve function intact grossly, no motor distal to the amputation site  · +2/4 Radial pulse  · Capillary refill present on the replanted digit, 3 seconds on the radial side, sluggish on the ulnar side. No signs of venous congestion at this time. · Sensation intact to touch in radial/ulnar/median nerve distributions to hand, no sensation distal to the amputation site.     CBC:   Lab Results   Component Value Date    WBC 12.3 12/08/2021    HGB 12.6 12/08/2021    HCT 36.1 12/08/2021     12/08/2021     PT/INR:    Lab Results   Component Value Date    PROTIME 10.9 10/02/2017    INR 1.0 10/02/2017       ASSESSMENT  · S/P right thumb replantation - POD#1    PLAN      · Continue protocol: KATLYN GRACIA  · 24 hour abx coverage  · Continue low dose heparin gtt  · Ok for servando pillow, no elevation of operative extremity at this time  · Room temperature >75F  · Orthopedics to preform dressing changes  · PT/OT  · Pain Control: IV and PO  · Monitor H&H  · D/C Plan:  Most likely to home, CM/SW recs appreciated

## 2021-12-08 NOTE — ED NOTES
Bed: 09  Expected date: 12/7/21  Expected time:   Means of arrival:   Comments:     Daquan Scott RN  12/07/21 2006

## 2021-12-08 NOTE — BRIEF OP NOTE
Brief Postoperative Note      Patient: Elvis Kaye  YOB: 1995  MRN: [id number]    Date of Procedure: 12/7/2021    Pre-Op Diagnosis: right thumb trumatic amputation    Post-Op Diagnosis: Same       Procedure(s):  RIGHT THUMB REIMPLANTATION    Surgeon(s):  Monique Maxwell MD    Assistant:  Resident: Suzette Closs, DO; Aicha Rivera DO    Anesthesia: General    Estimated Blood Loss (mL): less than 566     Complications: None    Specimens:   * No specimens in log *    Implants:  Implant Name Type Inv.  Item Serial No.  Lot No. LRB No. Used Action   K WIRE FIX L6IN DIA0.045IN [id number] [facility] [employer]]  K W[employer] FIX L6IN DIA0.045IN [id number] [facility] [employer]]  [facility] [employer] [id number] Right 2 Implanted         Drains:   Urethral Catheter Non-latex 16 fr (Active)       Findings: see op note    Electronically signed by Aicha Rivera DO on 12/8/2021 at 1:10 AM

## 2021-12-08 NOTE — ANESTHESIA PRE PROCEDURE
Department of Anesthesiology  Preprocedure Note       Name:  Olive Hodgkin   Age:  23 y.o.  :  1995                                          MRN:  [id number]         Date:  2021      Surgeon: Nicole Moralez):  Ita Nunez MD    Procedure: Procedure(s):  RIGHT THUMB REIMPLANTATION    Medications prior to admission:   Prior to Admission medications    Medication Sig Start Date End Date Taking?  Authorizing Provider   Ascorbic Acid (VITAMIN C) 500 MG CHEW Take 1 tablet by mouth 2 times daily for 7 days 21  Vesta Donate., APRN - CNP   zinc sulfate (ZINC-220) 220 (50 Zn) MG capsule Take 1 capsule by mouth daily for 7 days 21  Ilia Recinos., APRN - CNP   naproxen (NAPROSYN) 500 MG tablet Take 1 tablet by mouth 2 times daily for 7 days 7/31/15 8/7/15  Sophie Kelley PA-C       Current medications:    Current Facility-Administered Medications   Medication Dose Route Frequency Provider Last Rate Last Admin    0.9 % sodium chloride bolus  1,000 mL IntraVENous Once Kristin Croft  mL/hr at 21 2100 1,000 mL at 21 2100    meperidine (DEMEROL) injection 12.5 mg  12.5 mg IntraVENous Q5 Min PRN Yecenia Gant MD        promethazine (PHENERGAN) injection 6.25 mg  6.25 mg IntraVENous Once PRN Anthony Garcia MD        labetalol (NORMODYNE;TRANDATE) injection 5 mg  5 mg IntraVENous Q10 Min PRN Yecenia Gant MD        hydrALAZINE (APRESOLINE) injection 5 mg  5 mg IntraVENous Q10 Min PRN Yecenia Tucker MD        HYDROmorphone (DILAUDID) injection 0.25 mg  0.25 mg IntraVENous Q5 Min PRN Yecenia Tucker MD        HYDROmorphone (DILAUDID) injection 0.5 mg  0.5 mg IntraVENous Q5 Min PRN Yecenia Tucker MD         Current Outpatient Medications   Medication Sig Dispense Refill    Ascorbic Acid (VITAMIN C) 500 MG CHEW Take 1 tablet by mouth 2 times daily for 7 days 14 tablet 0    zinc sulfate (ZINC-220) 220 (50 Zn) MG capsule Take 1 capsule by mouth daily for 7 days 7 capsule 0    naproxen (NAPROSYN) 500 MG tablet Take 1 tablet by mouth 2 times daily for 7 days 14 tablet 0       Allergies:  No Known Allergies    Problem List:  There is no problem list on file for this patient. Past Medical History:        Diagnosis Date    Asthma        Past Surgical History:        Procedure Laterality Date    TONSILLECTOMY         Social History:    Social History     Tobacco Use    Smoking status: Never Smoker    Smokeless tobacco: Never Used   Substance Use Topics    Alcohol use: No                                Counseling given: Not Answered      Vital Signs (Current):   Vitals:    12/07/21 2007 12/07/21 2059   BP: (!) 94/50 (!) 142/82   Pulse: 84 77   Resp: 16 16   Temp: 36.8 °C (98.3 °F)    SpO2: 100%    Weight: 240 lb (108.9 kg)    Height: 5' 7\" (1.702 m)                                               BP Readings from Last 3 Encounters:   12/07/21 (!) 142/82   01/04/21 116/74   11/24/20 110/72       NPO Status:   ate pizza at 3pm with soda                                                                               BMI:   Wt Readings from Last 3 Encounters:   12/07/21 240 lb (108.9 kg)   01/04/21 240 lb (108.9 kg)   11/24/20 240 lb (108.9 kg)     Body mass index is 37.59 kg/m².     CBC:   Lab Results   Component Value Date    WBC 8.0 10/02/2017    RBC 5.09 10/02/2017    HGB 15.4 10/02/2017    HCT 43.0 10/02/2017    MCV 84.5 10/02/2017    RDW 11.5 10/02/2017     10/02/2017       CMP:   Lab Results   Component Value Date     10/02/2017    K 3.7 10/02/2017    CL 99 10/02/2017    CO2 24 10/02/2017    BUN 14 10/02/2017    CREATININE 1.0 10/02/2017    GFRAA >60 10/02/2017    LABGLOM >60 10/02/2017    GLUCOSE 100 10/02/2017    PROT 7.2 10/02/2017    CALCIUM 9.1 10/02/2017    BILITOT 0.9 10/02/2017    ALKPHOS 63 10/02/2017    AST 17 10/02/2017    ALT 33 10/02/2017       POC Tests: No results for input(s): POCGLU, POCNA, POCK, POCCL, POCBUN, POCHEMO, POCHCT in the last 72 hours. Coags:   Lab Results   Component Value Date    PROTIME 10.9 10/02/2017    INR 1.0 10/02/2017    APTT 26.9 10/02/2017       HCG (If Applicable): No results found for: PREGTESTUR, PREGSERUM, HCG, HCGQUANT     ABGs: No results found for: PHART, PO2ART, DYH4QWV, BIG8PGT, BEART, M4XMALVR     Type & Screen (If Applicable):  No results found for: LABABO, LABRH    Drug/Infectious Status (If Applicable):  No results found for: HIV, HEPCAB    COVID-19 Screening (If Applicable):   Lab Results   Component Value Date    COVID19 Detected 01/04/2021    COVID19 Not Detected 11/24/2020           Anesthesia Evaluation  Patient summary reviewed and Nursing notes reviewed no history of anesthetic complications:   Airway: Mallampati: II  TM distance: >3 FB   Neck ROM: full  Mouth opening: > = 3 FB Dental:          Pulmonary: breath sounds clear to auscultation      (-) not a current smoker  Asthma: grew out of asthma. Patient did not smoke on day of surgery. Cardiovascular:Negative CV ROS  Exercise tolerance: good (>4 METS),       (-)  angina      Rhythm: regular  Rate: normal                    Neuro/Psych:   Negative Neuro/Psych ROS              GI/Hepatic/Renal:   (+) GERD: poorly controlled,           Endo/Other: Negative Endo/Other ROS                    Abdominal:       Abdomen: soft. Vascular: negative vascular ROS. Other Findings:             Anesthesia Plan      general     ASA 1     (L hand 18g piv)  Induction: intravenous and rapid sequence. MIPS: Postoperative opioids intended and Prophylactic antiemetics administered. Anesthetic plan and risks discussed with patient. Use of blood products discussed with patient whom consented to blood products. Plan discussed with attending.                 Juan Srivastava RN   12/7/2021

## 2021-12-08 NOTE — CONSULTS
Department of Orthopedic Surgery  Resident Consult Note          Reason for Consult:  Traumatic R thumb amputation    HISTORY OF PRESENT ILLNESS:       Patient is a 23 y.o. male who presents as a transfer from [facility] for atraumatic right thumb amputation. Patient states that he was working and cleaning a razor blade like saw when it spun off and cut off part of his finger. He noted immediate pain and deformity to the affected digit. He immediately rinsed his hand in alcohol and put the amputated part of his right thumb into a plastic bag in order to take the emergency department. This amputated part of the finger was then placed into an eye solution soaked in saline soaked gauze. The patient is wishing to proceed with replantation if possible. Denies numbness/tingling/paresthesias. Denies any other orthopedic complaints at this time. Past Medical History:        Diagnosis Date    Asthma      Past Surgical History:        Procedure Laterality Date    TONSILLECTOMY       Current Medications:   Current Facility-Administered Medications: 0.9 % sodium chloride bolus, 1,000 mL, IntraVENous, Once  Allergies:  Patient has no known allergies. Social History:   TOBACCO:   reports that he has never smoked. He has never used smokeless tobacco.  ETOH:   reports no history of alcohol use. DRUGS:   reports no history of drug use. ACTIVITIES OF DAILY LIVING:    OCCUPATION:    Family History:   History reviewed. No pertinent family history.     REVIEW OF SYSTEMS:  CONSTITUTIONAL:  negative for  fevers, chills  EYES:  negative for acute vision changes  HEENT:  negative for cough, hearing loss  RESPIRATORY:  negative for  dyspnea, wheezing  CARDIOVASCULAR:  negative for  chest pain  GASTROINTESTINAL:  negative for nausea, vomiting  GENITOURINARY:  negative for frequency, urinary incontinence  HEMATOLOGIC/LYMPHATIC:  negative for bleeding  MUSCULOSKELETAL:  positive for  bone pain and amputation  NEUROLOGICAL: negative for headaches, dizziness  BEHAVIOR/PSYCH:  negative for increased agitation and anxiety    PHYSICAL EXAM:    VITALS:  BP (!) 94/50   Pulse 84   Temp 98.3 °F (36.8 °C)   Resp 16   Ht 5' 7\" (1.702 m)   Wt 240 lb (108.9 kg)   SpO2 100%   BMI 37.59 kg/m²   CONSTITUTIONAL:  awake, alert, cooperative, anxious, and appears stated age  MUSCULOSKELETAL:  Right upper Extremity:  · Skin there is a transverse laceration running from the radial border of the thumb to the ulnar border of the thumb at the distal aspect of the proximal phalanx running to the distal phalanx. There is exposed bone, tendon, with arterial bleeding present. The amputated digit was removed from saline for examination and x-rays. There is a linear laceration without significant soft tissue disturbances with exposed tendon and bone. · TTP about the right thumb no tenderness palpation about the rest of the hand, fingers, wrist, arm, elbow, humerus, shoulder, clavicle. · Sensation intact to light touch on the radial and ulnar borders of the thumb proximal to the amputation. Sensation of the remainder of the hand is grossly intact to radial, median, ulnar nerve distributions. · Motor function grossly intact distally in AIN, PIN, radial, median, ulnar nerve distributions  · Compartments soft and compression  · +2/4 radial and ulnar pulses    Secondary Exam:   · leftUE: No obvious signs of trauma. -TTP to fingers, hand, wrist, forearm, elbow, humerus, shoulder or clavicle. · bilateralLE: No obvious signs of trauma. -TTP to foot, ankle, leg, knee, thigh, hip.     · Pelvis: -TTP, -Log roll, -Heel strike     DATA:    CBC:   Lab Results   Component Value Date    WBC 8.0 10/02/2017    RBC 5.09 10/02/2017    HGB 15.4 10/02/2017    HCT 43.0 10/02/2017    MCV 84.5 10/02/2017    MCH 30.3 10/02/2017    MCHC 35.8 10/02/2017    RDW 11.5 10/02/2017     10/02/2017    MPV 8.7 10/02/2017     PT/INR:    Lab Results   Component Value Date PROTIME 10.9 10/02/2017    INR 1.0 10/02/2017       Radiology Review:  XR 3 views of the right hand reviewed demonstrating truamatic amputation through the level of the interphalangeal joint. No other fractures or dislocations noted. No other bony or soft tissue abnormalities noted. 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 Fozia Hayes  · Admission following surgery  · Pain control following surgery  · Antibiotics perioperatively for surgical prophylaxis  · DVT Prophylaxis following surgery  · Plan for stat replantation of traumatic right thumb amputation  · All patient/family questions have been answered and patient is currently agreeable to plan. · Discuss with Attending Dr. Fozia Hayes      Electronically signed by Pedro Currie DO on 12/7/2021 at 8:50 PM       I have seen and evaluated the patient and agree with the above assessment and plan on today's visit. I have performed the key components of the history and physical examination with significant findings of right dominant thumb oblique amputation of thumb at distal phalanx/interphalageal joint level. Disucssed salvage replantation vs revision amputation vs flap coverage in detail. All options explained and discussed. Plan for emergent surgery for exploration and replantation vs revision amputation/flap coverage. I concur with the findings and plan as documented. I explained the risks, benefits, alternatives and complications of surgery with the patient including but not limited to the risks of infection, possible damage to nerves, vessels, or tendons, stiffness, loss of range of motion, replantation failure, phantom thumb pain/amputation site pain,  scar sensitivity, wound healing complications, worsening symptoms, possible need for therapy, as well as the possible need further surgery and unanticipated complications.   The patient voiced understanding and all questions were answered. The patient elected to proceed with emergent surgical intervention.        Tera Slater MD  12/7/2021

## 2021-12-08 NOTE — OP NOTE
[address]                  [address] [facility],  [facility]                                OPERATIVE REPORT    PATIENT NAME: Philip Coffey                     :        1995  MED REC NO:   [id number]                            ROOM:       3804  ACCOUNT NO:   [de-identified]                           ADMIT DATE: 2021  PROVIDER:     Dayami Mccormick MD    DATE OF PROCEDURE:  2021    PREOPERATIVE DIAGNOSIS:  Right thumb amputation at the level of  interphalangeal joint. POSTOPERATIVE DIAGNOSIS:  Right thumb amputation at the level of  interphalangeal joint. OPERATIONS PERFORMED:  1. Right thumb excisional debridement and removal of fracture  fragments, proximal and distal phalanges. 2.  Replantation of right thumb with repair of both radial and ulnar  digital nerves with use of intraoperative microscope. 3.  Repair of ulnar digital artery of thumb with use of intraoperative  microscope. ANESTHESIA:  General.    SURGEON:  Dayami Mccormick MD    ASSISTANT:  Sheeba Reddy DO, orthopedic surgery resident    TOURNIQUET TIME:  Approximately 89 minutes. ESTIMATED BLOOD LOSS:  Less than 50 mL. FINDINGS:  1.  Oblique amputation extending from the level of the IP joint proximal  ulnarly and extending distally to the level of the ulnar pulp of the  thumb. There was a target vessel proximally over the proximal radial  aspect of the amputation site. 2.  Status post microvascular repair. There was good flow across the  repaired vessel. 3.  The radial digital nerve was amenable to direct repair under  microscope. 4.  The ulnar digital nerve distally above the pulp was able to be  loosely reapproximated with 9-0 nylon with use of intraoperative  microscope. 5.  Two dorsal veins were repaired with use of intraoperative  microscope. 6.  Both the flexor pollicis longus and the extensor pollicis longus  tendons were able to be repaired.   7.  At the conclusion of the case, there was good turgor to the thumb  with modest cap refill of the pulp. SPECIMEN:  None. DISPOSITION:  The patient remained stable throughout the procedure, was  taken to the intensive care unit for close monitoring and  anticoagulation. OPERATIVE INDICATIONS:  The patient is a 67-year-old gentleman who  sustained an amputation to his right thumb while at work. He was  accepted in transfer to the general trauma service followed by  subsequent consultation to the hand service for evaluation of the  amputation. Extensive discussion was undertaken with the patient in  regards to treatment options including revision amputation, flap  coverage, and lastly replantation. After extensive discussion with the  patient as well as the patient's mother, he wished to proceed with  attempted replantation with the knowledge that amputation is probable. Risks of surgery included but not limited to the risk of infection;  damage to nerves, vessels, tendons; failure of the replantation;  amputation site pain; possible need for additional surgery, therapy and  possible loss of the digit. He voiced understanding and wished to  proceed. SURGERY IN DETAIL:  The patient was identified in the emergency  department. He was taken directly from the emergency department to the  operating room suite. He underwent general anesthesia per anesthesia  department. All bony prominences were well padded. The right upper  extremity was prepped and draped in standard sterile fashion. The  amputated digit was also cleansed and repaired in the standard sterile  fashion. The amputated digital part was inspected while the patient was  being prepped. This was a significantly oblique amputation extending  from the ulnar distal aspect of the thumb pulp, distal to the nail fold,  extending obliquely and proximally to the level of the IP joint.   A  small fragment of the proximal phalanx was present and loosely attached  with small piece of capsular tissue. This was excised. Dissection was  performed over the radial aspect identifying potential vessel for  repair. A very small vessel was appreciated at this level. Again, the  amputation extended distally and obliquely to the level of the pulp. No  significant ulnar target vessel was present. With this identified, the  remnants of the terminal tendon dorsally as well as the flexor pollicis  longus palmarly were identified in the wound and the amputated digital  part copiously irrigated out. Right upper extremity was prepped and draped in standard sterile  fashion. Arm was exsanguinated. Tourniquet was inflated to 250 mmHg. Total time was 89 minutes. The amputation site was inspected. Again,  there was small fragment of the distal phalanx present over the digit  remaining central 50-30% of the articular surfaces present. These loose  bony fracture fragments were sharply excised and removed. The radial  aspect of the thumb amputation site was dissected out. A healthy large  nerve was present. The accompanying vessel was very tiny. The  tourniquet was deflated briefly. There was flow across the small vessel  over the radial aspect. Tourniquet was reinflated. The wound was  thoroughly and copiously irrigated out. Fluoroscopy was brought in. Two K-wires were placed over the distal  phalanx to place the amputated portion in slight flexion to allow for  nerve and tendon repairs and pin into position under fluoroscopic  imaging. Flexor pollicis longus tendon was identified proximally,  cleansed and a double-locking cruciate anastomosis was used to achieve  repair. Similarly dorsally, the extensor tendon was repaired to the  insertion site distally. With this accomplished, the microscope was  then brought in. Under microscopic magnification, the radial digital  nerve was repaired in epineural fashion using 8-0 nylon suture.   At this  point, the tourniquet was deflated. The proximal vessel was identified,  and flow was established. This was controlled with a vessel clip and  9-0 nylon suture was used to anastomose distal vessel after  heparinization. Flow was achieved across the anastomosis using a  milking maneuver confirming anastomosis. There was improved turgor to  the pulp of the thumb. Microscope was placed dorsally. Two large dorsal veins were able to be  identified and loosely approximated using 9-0 nylon suture to provide  adequate outflow. Lastly, in the very distal aspect of the thumb, a  small target nerve was identified, and an epineural repair was achieved  with a 9-0 nylon suture under microscopic magnification. With this  completed, the room temperature was elevated. The patient was bolused  about 5000 units of heparin and then started on heparin drip. Again,  there was modest cap refill and turgor restored to the thumb pulp. The  patient was admitted to the intensive care unit for heparinization and  close monitoring of his replanted digit.         Rigo Ding MD    D: 12/08/2021 0:57:00       T: 12/08/2021 1:01:39     [id number]  Job#: [id number]     Doc#: [id number]    CC:

## 2021-12-08 NOTE — ANESTHESIA POSTPROCEDURE EVALUATION
Department of Anesthesiology  Postprocedure Note    Patient: Jade Chopra  MRN: [id number]  [place]: 1995  Date of evaluation: 12/8/2021  Time:  1:36 AM     Procedure Summary     Date: 12/07/21 Room / Location: [facility] OR  / [place] [facility]    Anesthesia Start: 2141 Anesthesia Stop: 12/08/21 0136    Procedure: RIGHT THUMB REIMPLANTATION (Right Thumb) Diagnosis: (right thumb trumatic injury)    Surgeons: Murray Dorantes MD Responsible Provider: Ramón Woody MD    Anesthesia Type: general ASA Status: 1          Anesthesia Type: general    Dean Phase I: Dean Score: 8    Dean Phase II:      Last vitals: Reviewed and per EMR flowsheets.        Anesthesia Post Evaluation    Patient location during evaluation: ICU  Patient participation: complete - patient participated  Level of consciousness: awake  Pain score: 1  Airway patency: patent  Nausea & Vomiting: no nausea and no vomiting  Complications: no  Cardiovascular status: hemodynamically stable  Respiratory status: acceptable  Hydration status: stable

## 2021-12-08 NOTE — ED PROVIDER NOTES
HPI:  12/7/21, Time: 8:07 PM TIFFANY Sloan is a 23 y.o. male presenting to the ED for history of thumb amputation, beginning hours ago. The complaint has been persistent, moderate in severity, and worsened by movement of right hand. Patient was brought here from outlying facility. Patient was seen at outlying facility patient reports he was at work and cut part of his thumb off on machinery. Patient was seen there and was given IV antibiotics. Patient reporting no other complaints of any chest pain difficulty breathing or abdominal pain. Patient was sent here for orthopedic evaluation. ROS:   Pertinent positives and negatives are stated within HPI, all other systems reviewed and are negative.  --------------------------------------------- PAST HISTORY ---------------------------------------------  Past Medical History:  has a past medical history of Asthma. Past Surgical History:  has a past surgical history that includes Tonsillectomy. Social History:  reports that he has never smoked. He has never used smokeless tobacco. He reports that he does not drink alcohol and does not use drugs. Family History: family history is not on file. The patients home medications have been reviewed. Allergies: Patient has no known allergies. ---------------------------------------------------PHYSICAL EXAM--------------------------------------    Constitutional/General: Alert and oriented x3,   Head: Normocephalic and atraumatic  Eyes: PERRL, EOMI  Mouth: Oropharynx clear, handling secretions, no trismus  Neck: Supple, full ROM, non tender to palpation in the midline, no stridor, no crepitus, no meningeal signs  Pulmonary: Lungs clear to auscultation bilaterally, no wheezes, rales, or rhonchi. Not in respiratory distress  Cardiovascular:  Regular rate. Regular rhythm. No murmurs, gallops, or rubs. 2+ distal pulses  Chest: no chest wall tenderness  Abdomen: Soft. Non tender. Non distended. +BS.  No rebound, guarding, or rigidity. No pulsatile masses appreciated. Musculoskeletal: Moves all extremities x 4. Patient does have dressing to right hand. Patient does have amputation at the interphalangeal joint of thumb noted arterial bleeding warm and well perfused, no clubbing, cyanosis, or edema. Capillary refill <3 seconds  Skin: warm and dry. No rashes. Neurologic: GCS 15, CN 2-12 grossly intact, no focal deficits, symmetric strength 5/5 in the upper and lower extremities bilaterally  Psych: Normal Affect    -------------------------------------------------- RESULTS -------------------------------------------------  I have personally reviewed all laboratory and imaging results for this patient. Results are listed below. LABS:  No results found for this visit on 12/07/21. RADIOLOGY:  Interpreted by Radiologist.  XR HAND RIGHT (MIN 3 VIEWS)   Final Result   Amputation of the thumb at the level of the interphalangeal joint. Small   fracture fragment from the distal phalanx remains with proximal thumb. Small   fragment of the proximal phalanx is amputated with distal portion. EKG Interpretation      ------------------------- NURSING NOTES AND VITALS REVIEWED ---------------------------   The nursing notes within the ED encounter and vital signs as below have been reviewed by myself. BP (!) 142/82   Pulse 77   Temp 98.3 °F (36.8 °C)   Resp 16   Ht 5' 7\" (1.702 m)   Wt 240 lb (108.9 kg)   SpO2 100%   BMI 37.59 kg/m²   Oxygen Saturation Interpretation: Normal    The patients available past medical records and past encounters were reviewed.         ------------------------------ ED COURSE/MEDICAL DECISION MAKING----------------------  Medications   meperidine (DEMEROL) injection 12.5 mg (has no administration in time range)   promethazine (PHENERGAN) injection 6.25 mg (has no administration in time range)   labetalol (NORMODYNE;TRANDATE) injection 5 mg (has no administration in time range)   hydrALAZINE (APRESOLINE) injection 5 mg (has no administration in time range)   HYDROmorphone (DILAUDID) injection 0.25 mg (has no administration in time range)   HYDROmorphone (DILAUDID) injection 0.5 mg (has no administration in time range)   povidone-iodine (BETADINE) 10 % external solution (1 Bottle Topical Given 12/7/21 2230)   sodium chloride 0.9 % 500 mL with heparin (porcine) 5,000 Units (  Given 12/7/21 2324)   0.9 % sodium chloride bolus (1,000 mLs IntraVENous New Bag 12/7/21 2100)   diptheria-tetanus toxoids (DECAVAC) 2-2 LF/0.5ML injection 0.5 mL (0.5 mLs IntraMUSCular Given 12/7/21 2031)             Medical Decision Making:    Patient presenting here from [facility] facility. Patient cut thumb while at work. Patient reports it was cut on machinery. Patient was seen [facility] sent here for further evaluation. Patient was eval by orthopedics here in the emergency department. Patient was ordered tetanus. He reportedly received IV antibiotics at [facility]. Plan will be as per Ortho    Re-Evaluations:             Re-evaluation. Patients symptoms show no change      Consultations:               Ortho  Critical Care: This patient's ED course included: a personal history and physicial eaxmination    This patient has been closely monitored during their ED course. Counseling: The emergency provider has spoken with the patient and discussed todays results, in addition to providing specific details for the plan of care and counseling regarding the diagnosis and prognosis. Questions are answered at this time and they are agreeable with the plan.       --------------------------------- IMPRESSION AND DISPOSITION ---------------------------------    IMPRESSION  1.  Amputation of right thumb with complication, initial encounter        DISPOSITION  Disposition: As per Ortho  Patient condition is stable        NOTE: This report was transcribed using voice recognition

## 2021-12-08 NOTE — CARE COORDINATION
12/8 Care Coordination:Pt presenting to the ED for history of thumb amputation. Pt was at work and cut part of his thumb off on machinery. Pt went to OR for RIGHT THUMB REIMPLANTATION. Post op to SICU. Continue low dose heparin gtt. IV ATB. Met with pt in room Grayson Chester was sent to Rayne Van at [facility] via H?REL. Pt works for [employer] in [place]. States he has medical Ins Also from [facility]. Spoke with Jose D Sandy at [employer], [facility] is [employer]. CM is Darling Vasquez, [phone]. No Clam# yet. Discharge plan remains back home. CM/JOVANNY will continue to follow for discharge planning.    Angie WILSONN,RN--BC  [phone]

## 2021-12-08 NOTE — CONSULTS
Surgical Intensive Care Unit  Consult Note     Date of admission:  12/7/2021    Reason for ICU transfer:  right thumb trumatic amputation     Patient is a 23 y.o. male who presents as a transfer from [facility] for atraumatic right thumb amputation. Patient states that he was working and cleaning a razor blade like saw when it spun off and cut off part of his finger. Currently s/p right thumb trumatic amputation.      Hospital course:  12/8/21  - s/p right thumb trumatic amputation, being brought into SICU for low dose heparin and warm room for aid in microvascular healing       Physical Exam:  BP (!) 142/82   Pulse 77   Temp 98.3 °F (36.8 °C)   Resp 16   Ht 5' 7\" (1.702 m)   Wt 240 lb (108.9 kg)   SpO2 100%   BMI 37.59 kg/m²   CONSTITUTIONAL:  awake, alert, cooperative, no apparent distress, and appears stated age  EYES:  pupils equal, round and reactive to light  LUNGS:  No increased work of breathing, good air exchange, clear to auscultation bilaterally, no crackles or wheezing  CARDIOVASCULAR:  regular rate and rhythm  ABDOMEN:  No scars, normal bowel sounds, soft, non-distended, non-tender, no masses palpated, no hepatosplenomegally  SKIN:  right arm in foam brace, wrapped per ortho surgery     ASSESSMENT / PLAN:  · Neuro:  Acute pain syndrome-- dilaudid, felipe   · CV: HTN - PRN HTN medication, Monitor hemodynamics  · Pulm: Acute respiratory insuffiencey  - post-operatively--on face mask-- wean as able, Monitor RR, SpO2  · GI:  Okay for general diet   · Renal: No acute issues - will remove aguilar, Monitor UOP, monitor E-lytes   · ID:  S/p traumatic right thumb amp - continue ancef,  Monitor for SIRS  · Endo:  no acute issues-  Monitor glucose  · MSK: S/p right thumb trumatic amputation-- low dose heparin, will warm room to aid in microvascular healing  Orthopedic surgery following/ admitting team    Total fluid rate: LR 75 c  Bowel regimen: glycolax  DVT proph:  scd   GI proph:  none   Glucose protocol: none  Mouth/eye care: none  Janice:   Will remove  CVC sites:  none  Ancillary consults: ortho  Family Update: As able         Thomas Peres, DO  PGY-2  Critical Care

## 2021-12-09 LAB
ALBUMIN SERPL-MCNC: 4.2 G/DL (ref 3.5–5.2)
ALP BLD-CCNC: 47 U/L (ref 40–129)
ALT SERPL-CCNC: 19 U/L (ref 0–40)
ANION GAP SERPL CALCULATED.3IONS-SCNC: 10 MMOL/L (ref 7–16)
APTT: 52.4 SEC (ref 24.5–35.1)
AST SERPL-CCNC: 15 U/L (ref 0–39)
BILIRUB SERPL-MCNC: 0.8 MG/DL (ref 0–1.2)
BUN BLDV-MCNC: 9 MG/DL (ref 6–20)
CALCIUM IONIZED: 1.27 MMOL/L (ref 1.15–1.33)
CALCIUM SERPL-MCNC: 9.5 MG/DL (ref 8.6–10.2)
CHLORIDE BLD-SCNC: 106 MMOL/L (ref 98–107)
CO2: 24 MMOL/L (ref 22–29)
CREAT SERPL-MCNC: 0.8 MG/DL (ref 0.7–1.2)
GFR AFRICAN AMERICAN: >60
GFR NON-AFRICAN AMERICAN: >60 ML/MIN/1.73
GLUCOSE BLD-MCNC: 99 MG/DL (ref 74–99)
HCT VFR BLD CALC: 37.8 % (ref 37–54)
HEMOGLOBIN: 12.8 G/DL (ref 12.5–16.5)
MAGNESIUM: 2.4 MG/DL (ref 1.6–2.6)
MCH RBC QN AUTO: 30.5 PG (ref 26–35)
MCHC RBC AUTO-ENTMCNC: 33.9 % (ref 32–34.5)
MCV RBC AUTO: 90.2 FL (ref 80–99.9)
PDW BLD-RTO: 12.5 FL (ref 11.5–15)
PHOSPHORUS: 3.2 MG/DL (ref 2.5–4.5)
PLATELET # BLD: 241 E9/L (ref 130–450)
PMV BLD AUTO: 9.9 FL (ref 7–12)
POTASSIUM SERPL-SCNC: 3.8 MMOL/L (ref 3.5–5)
RBC # BLD: 4.19 E12/L (ref 3.8–5.8)
SODIUM BLD-SCNC: 140 MMOL/L (ref 132–146)
TOTAL PROTEIN: 6.9 G/DL (ref 6.4–8.3)
WBC # BLD: 11.9 E9/L (ref 4.5–11.5)

## 2021-12-09 PROCEDURE — 83735 ASSAY OF MAGNESIUM: CPT

## 2021-12-09 PROCEDURE — 85730 THROMBOPLASTIN TIME PARTIAL: CPT

## 2021-12-09 PROCEDURE — 2000000000 HC ICU R&B

## 2021-12-09 PROCEDURE — 6370000000 HC RX 637 (ALT 250 FOR IP): Performed by: STUDENT IN AN ORGANIZED HEALTH CARE EDUCATION/TRAINING PROGRAM

## 2021-12-09 PROCEDURE — 36415 COLL VENOUS BLD VENIPUNCTURE: CPT

## 2021-12-09 PROCEDURE — 6360000002 HC RX W HCPCS: Performed by: STUDENT IN AN ORGANIZED HEALTH CARE EDUCATION/TRAINING PROGRAM

## 2021-12-09 PROCEDURE — 82330 ASSAY OF CALCIUM: CPT

## 2021-12-09 PROCEDURE — 99233 SBSQ HOSP IP/OBS HIGH 50: CPT | Performed by: SURGERY

## 2021-12-09 PROCEDURE — 80053 COMPREHEN METABOLIC PANEL: CPT

## 2021-12-09 PROCEDURE — 2580000003 HC RX 258: Performed by: STUDENT IN AN ORGANIZED HEALTH CARE EDUCATION/TRAINING PROGRAM

## 2021-12-09 PROCEDURE — 84100 ASSAY OF PHOSPHORUS: CPT

## 2021-12-09 PROCEDURE — 85027 COMPLETE CBC AUTOMATED: CPT

## 2021-12-09 RX ORDER — POTASSIUM CHLORIDE 20 MEQ/1
20 TABLET, EXTENDED RELEASE ORAL ONCE
Status: COMPLETED | OUTPATIENT
Start: 2021-12-09 | End: 2021-12-09

## 2021-12-09 RX ORDER — ACETAMINOPHEN 325 MG/1
650 TABLET ORAL EVERY 6 HOURS PRN
Status: DISCONTINUED | OUTPATIENT
Start: 2021-12-09 | End: 2021-12-10

## 2021-12-09 RX ADMIN — HYDROMORPHONE HYDROCHLORIDE 0.5 MG: 1 INJECTION, SOLUTION INTRAMUSCULAR; INTRAVENOUS; SUBCUTANEOUS at 15:37

## 2021-12-09 RX ADMIN — HYDROMORPHONE HYDROCHLORIDE 0.5 MG: 1 INJECTION, SOLUTION INTRAMUSCULAR; INTRAVENOUS; SUBCUTANEOUS at 19:51

## 2021-12-09 RX ADMIN — ACETAMINOPHEN 650 MG: 325 TABLET ORAL at 00:04

## 2021-12-09 RX ADMIN — OXYCODONE HYDROCHLORIDE 10 MG: 10 TABLET ORAL at 09:01

## 2021-12-09 RX ADMIN — HYDROMORPHONE HYDROCHLORIDE 0.5 MG: 1 INJECTION, SOLUTION INTRAMUSCULAR; INTRAVENOUS; SUBCUTANEOUS at 10:21

## 2021-12-09 RX ADMIN — OXYCODONE HYDROCHLORIDE 10 MG: 10 TABLET ORAL at 04:35

## 2021-12-09 RX ADMIN — POTASSIUM CHLORIDE 20 MEQ: 1500 TABLET, EXTENDED RELEASE ORAL at 13:01

## 2021-12-09 RX ADMIN — OXYCODONE HYDROCHLORIDE 10 MG: 10 TABLET ORAL at 20:56

## 2021-12-09 RX ADMIN — HEPARIN SODIUM 13 UNITS/KG/HR: 10000 INJECTION, SOLUTION INTRAVENOUS at 14:09

## 2021-12-09 RX ADMIN — ACETAMINOPHEN 650 MG: 325 TABLET ORAL at 05:42

## 2021-12-09 RX ADMIN — SODIUM CHLORIDE, PRESERVATIVE FREE 10 ML: 5 INJECTION INTRAVENOUS at 20:59

## 2021-12-09 RX ADMIN — SODIUM CHLORIDE, PRESERVATIVE FREE 10 ML: 5 INJECTION INTRAVENOUS at 08:58

## 2021-12-09 ASSESSMENT — PAIN SCALES - GENERAL
PAINLEVEL_OUTOF10: 7
PAINLEVEL_OUTOF10: 0
PAINLEVEL_OUTOF10: 0
PAINLEVEL_OUTOF10: 8
PAINLEVEL_OUTOF10: 8
PAINLEVEL_OUTOF10: 0
PAINLEVEL_OUTOF10: 0
PAINLEVEL_OUTOF10: 10
PAINLEVEL_OUTOF10: 6
PAINLEVEL_OUTOF10: 7
PAINLEVEL_OUTOF10: 0
PAINLEVEL_OUTOF10: 7
PAINLEVEL_OUTOF10: 10
PAINLEVEL_OUTOF10: 0
PAINLEVEL_OUTOF10: 7
PAINLEVEL_OUTOF10: 8
PAINLEVEL_OUTOF10: 10

## 2021-12-09 ASSESSMENT — PAIN DESCRIPTION - FREQUENCY
FREQUENCY: CONTINUOUS

## 2021-12-09 ASSESSMENT — PAIN DESCRIPTION - PAIN TYPE
TYPE: ACUTE PAIN;SURGICAL PAIN
TYPE: ACUTE PAIN;SURGICAL PAIN
TYPE: ACUTE PAIN

## 2021-12-09 ASSESSMENT — PAIN DESCRIPTION - ORIENTATION
ORIENTATION: RIGHT

## 2021-12-09 ASSESSMENT — PAIN DESCRIPTION - LOCATION
LOCATION: HAND;WRIST
LOCATION: HAND
LOCATION: HAND;WRIST

## 2021-12-09 ASSESSMENT — PAIN DESCRIPTION - ONSET
ONSET: ON-GOING

## 2021-12-09 ASSESSMENT — PAIN DESCRIPTION - DESCRIPTORS
DESCRIPTORS: ACHING;PRESSURE
DESCRIPTORS: ACHING;PRESSURE

## 2021-12-09 NOTE — PROGRESS NOTES
Department of Orthopedic Surgery  Starr Michael MD      Subjective:  Pt has little complaints today. Resting comfortably in room. All questions answered    Vitals  VITALS:  BP (!) 131/93   Pulse 78   Temp 97.6 °F (36.4 °C) (Oral)   Resp 12   Ht 5' 6\" (1.676 m)   Wt 236 lb 6.4 oz (107.2 kg)   SpO2 97%   BMI 38.16 kg/m²     PHYSICAL EXAM:    Orientation:  alert and oriented to person, place and time    Right Upper Extremity    Dressing changed. Incision remains approximated. Thumb tip black around pin sites. Pulp with warmth and turgor. Slight duskiness to pulp        LABS:  CBC:   Lab Results   Component Value Date    WBC 12.3 12/08/2021    RBC 4.14 12/08/2021    HGB 12.6 12/08/2021    HCT 36.1 12/08/2021    MCV 87.2 12/08/2021    MCH 30.4 12/08/2021    MCHC 34.9 12/08/2021    RDW 12.0 12/08/2021     12/08/2021    MPV 9.8 12/08/2021     PTT (12-8-21) 61.7      ASSESSMENT: POD # 2 right thumb replantation      PLAN:  1:  Continue Heparin drip today.  Plan for possible ASA therapy tomorrow  2:  Continue warm room and diet restrictions  3:  Possible transition to intermediate unit when start ASA therapy  4:  D/C Plan:  After heparin drip completed and ASA trial completed    Electronically signed by Sushant Seth MD on 12/9/2021 at 5:56 AM

## 2021-12-09 NOTE — PROGRESS NOTES
CC: Postoperative monitoring s/p right distal thumb replantation    ASSESSMENT:  POD-2-right thumb replantation    PLAN:  Monitor replanted thumb for viability  SCDs, heparin drip  Regular diet  Multimodal analgesia  Monitor in ICU for additional 24 hours    SIGNIFICANT 24 HOUR EVENTS/NEW COMPLAINTS:  12/9-complaining of mild pain with some throbbing. No other complaints      PHYSICAL EXAM:  General -Young, pleasant, well developed, well-nourished white man  Neuro -awake, alert, attentive  Head/Face/Neck -WNL  CV -normal sinus rhythm  Pulm -nonlabored  Chest -WNL  Abdomen -nondistended, nontender  Musculoskeletal -right hand and thumb splinted with visible pins.   Visible skin appears somewhat dusky  Skin -no rash

## 2021-12-09 NOTE — PROGRESS NOTES
Patient seen at bedside this afternoon. He notes that pain has been well controlled to the right thumb at the surgical site. No acute concerns aside from some change to the discoloration of the distal thumb tip.     Examination of the thumb reveals dressing in place  Pins in place  Pulp is dusky, no capillary refill noted, pale at the nailbed  No pain to palpation of the distal tip, no sensation to light touch    Plan to continue to monitor patient progress with relation to his recent surgery

## 2021-12-10 ENCOUNTER — TELEPHONE (OUTPATIENT)
Dept: ORTHOPEDIC SURGERY | Age: 26
End: 2021-12-10

## 2021-12-10 VITALS
RESPIRATION RATE: 22 BRPM | SYSTOLIC BLOOD PRESSURE: 133 MMHG | HEART RATE: 79 BPM | BODY MASS INDEX: 37.99 KG/M2 | HEIGHT: 66 IN | WEIGHT: 236.4 LBS | TEMPERATURE: 98.2 F | DIASTOLIC BLOOD PRESSURE: 88 MMHG | OXYGEN SATURATION: 96 %

## 2021-12-10 LAB
APTT: 36.7 SEC (ref 24.5–35.1)
HCT VFR BLD CALC: 38.3 % (ref 37–54)
HEMOGLOBIN: 13.2 G/DL (ref 12.5–16.5)
MCH RBC QN AUTO: 31 PG (ref 26–35)
MCHC RBC AUTO-ENTMCNC: 34.5 % (ref 32–34.5)
MCV RBC AUTO: 89.9 FL (ref 80–99.9)
PDW BLD-RTO: 12.3 FL (ref 11.5–15)
PLATELET # BLD: 249 E9/L (ref 130–450)
PMV BLD AUTO: 9.6 FL (ref 7–12)
RBC # BLD: 4.26 E12/L (ref 3.8–5.8)
WBC # BLD: 9.4 E9/L (ref 4.5–11.5)

## 2021-12-10 PROCEDURE — 6370000000 HC RX 637 (ALT 250 FOR IP): Performed by: STUDENT IN AN ORGANIZED HEALTH CARE EDUCATION/TRAINING PROGRAM

## 2021-12-10 PROCEDURE — 85027 COMPLETE CBC AUTOMATED: CPT

## 2021-12-10 PROCEDURE — 6360000002 HC RX W HCPCS: Performed by: STUDENT IN AN ORGANIZED HEALTH CARE EDUCATION/TRAINING PROGRAM

## 2021-12-10 PROCEDURE — 36415 COLL VENOUS BLD VENIPUNCTURE: CPT

## 2021-12-10 PROCEDURE — 2580000003 HC RX 258: Performed by: STUDENT IN AN ORGANIZED HEALTH CARE EDUCATION/TRAINING PROGRAM

## 2021-12-10 PROCEDURE — 85730 THROMBOPLASTIN TIME PARTIAL: CPT

## 2021-12-10 PROCEDURE — 99232 SBSQ HOSP IP/OBS MODERATE 35: CPT | Performed by: SURGERY

## 2021-12-10 RX ORDER — ASPIRIN 325 MG
325 TABLET, DELAYED RELEASE (ENTERIC COATED) ORAL 2 TIMES DAILY
Qty: 60 TABLET | Refills: 1 | Status: ON HOLD | OUTPATIENT
Start: 2021-12-10 | End: 2021-12-15 | Stop reason: HOSPADM

## 2021-12-10 RX ORDER — HYDROCODONE BITARTRATE AND ACETAMINOPHEN 5; 325 MG/1; MG/1
2 TABLET ORAL EVERY 4 HOURS PRN
Status: DISCONTINUED | OUTPATIENT
Start: 2021-12-10 | End: 2021-12-10 | Stop reason: HOSPADM

## 2021-12-10 RX ORDER — HYDROCODONE BITARTRATE AND ACETAMINOPHEN 5; 325 MG/1; MG/1
1 TABLET ORAL EVERY 4 HOURS PRN
Status: DISCONTINUED | OUTPATIENT
Start: 2021-12-10 | End: 2021-12-10 | Stop reason: HOSPADM

## 2021-12-10 RX ORDER — HYDROCODONE BITARTRATE AND ACETAMINOPHEN 5; 325 MG/1; MG/1
2 TABLET ORAL EVERY 6 HOURS PRN
Qty: 56 TABLET | Refills: 0 | Status: SHIPPED | OUTPATIENT
Start: 2021-12-10 | End: 2021-12-17

## 2021-12-10 RX ORDER — CEPHALEXIN 500 MG/1
500 CAPSULE ORAL 4 TIMES DAILY
Qty: 40 CAPSULE | Refills: 0 | Status: SHIPPED | OUTPATIENT
Start: 2021-12-10 | End: 2021-12-20

## 2021-12-10 RX ADMIN — ASPIRIN 325 MG: 325 TABLET, COATED ORAL at 08:17

## 2021-12-10 RX ADMIN — OXYCODONE HYDROCHLORIDE 10 MG: 10 TABLET ORAL at 08:22

## 2021-12-10 RX ADMIN — HYDROMORPHONE HYDROCHLORIDE 0.5 MG: 1 INJECTION, SOLUTION INTRAMUSCULAR; INTRAVENOUS; SUBCUTANEOUS at 00:05

## 2021-12-10 RX ADMIN — HYDROMORPHONE HYDROCHLORIDE 0.5 MG: 1 INJECTION, SOLUTION INTRAMUSCULAR; INTRAVENOUS; SUBCUTANEOUS at 09:56

## 2021-12-10 RX ADMIN — HYDROCODONE BITARTRATE AND ACETAMINOPHEN 2 TABLET: 5; 325 TABLET ORAL at 12:10

## 2021-12-10 RX ADMIN — HYDROCODONE BITARTRATE AND ACETAMINOPHEN 2 TABLET: 5; 325 TABLET ORAL at 16:23

## 2021-12-10 RX ADMIN — SODIUM CHLORIDE, PRESERVATIVE FREE 10 ML: 5 INJECTION INTRAVENOUS at 08:00

## 2021-12-10 RX ADMIN — HYDROMORPHONE HYDROCHLORIDE 0.5 MG: 1 INJECTION, SOLUTION INTRAMUSCULAR; INTRAVENOUS; SUBCUTANEOUS at 05:08

## 2021-12-10 RX ADMIN — ASPIRIN 325 MG: 325 TABLET, COATED ORAL at 16:21

## 2021-12-10 RX ADMIN — HEPARIN SODIUM 13 UNITS/KG/HR: 10000 INJECTION, SOLUTION INTRAVENOUS at 09:48

## 2021-12-10 RX ADMIN — OXYCODONE HYDROCHLORIDE 10 MG: 10 TABLET ORAL at 02:32

## 2021-12-10 ASSESSMENT — PAIN SCALES - GENERAL
PAINLEVEL_OUTOF10: 0
PAINLEVEL_OUTOF10: 10
PAINLEVEL_OUTOF10: 7
PAINLEVEL_OUTOF10: 10
PAINLEVEL_OUTOF10: 0
PAINLEVEL_OUTOF10: 7

## 2021-12-10 NOTE — PROGRESS NOTES
Surgical Intensive Care Unit  Daily Progress Note  Date of admission:  12/7/2021  Reason for ICU transfer:  r thumb reimplantaion     Subjective:  POD 3 R thumb amputation    Hospital Course:  12/8: Patient admitted to SICU s/p R thumb reimplantation per ortho. Patient pain well controlled  12/9: Patient complaining of thumb throbbing pain. He is up and walking. No other complaints   12/10: mild thumb pain: states his thumb has become \"dusky\". No other complaints at this time. Physical Exam:  /70   Pulse 58   Temp 98.7 °F (37.1 °C) (Oral)   Resp 17   Ht 5' 6\" (1.676 m)   Wt 236 lb 6.4 oz (107.2 kg)   SpO2 97%   BMI 38.16 kg/m²     Physical Exam  Constitutional:       General: He is not in acute distress. Appearance: He is well-developed. He is not diaphoretic. HENT:      Head: Normocephalic and atraumatic. Eyes:      General: No scleral icterus. Pupils: Pupils are equal, round, and reactive to light. Neck:      Thyroid: No thyromegaly. Vascular: No JVD. Trachea: No tracheal deviation. Cardiovascular:      Rate and Rhythm: Normal rate and regular rhythm. Pulmonary:      Effort: Pulmonary effort is normal. No respiratory distress. Chest:      Chest wall: No tenderness. Abdominal:      General: There is no distension. Palpations: Abdomen is soft. There is no mass. Tenderness: There is no abdominal tenderness. There is no guarding or rebound. Musculoskeletal:         General: Normal range of motion. Comments: R forearm/hand wrapped per ortho   Skin:     General: Skin is warm and dry. Capillary Refill: Capillary refill takes less than 2 seconds. Coloration: Skin is not pale. Findings: No rash. Neurological:      Mental Status: He is alert and oriented to person, place, and time. Cranial Nerves: No cranial nerve deficit. Psychiatric:         Behavior: Behavior normal.         Thought Content:  Thought content normal.         Judgment:

## 2021-12-10 NOTE — TELEPHONE ENCOUNTER
Called patient and left voicemail for patient to schedule an appointment with Dr Wade Priest on Monday or Tuesday next week. Awaiting call back.

## 2021-12-10 NOTE — CARE COORDINATION
Care Coordiantion  [facility] unable to accept the patiwent and they have no staffing so I made a referral to [facility] to [facility] and they cannot go out to see the patient till Tuesday December 14th.  C 9 has been completted

## 2021-12-10 NOTE — PROGRESS NOTES
Department of Orthopedic Surgery  Hand Progress Note      Subjective:  Pt has little complaints today. Main issue is pain. Resting comfortably in room. All questions answered. Discussed expected outcomes with the patient today. He is aware there is a high chance that his replant or a portion of his replant wont succeed. Vitals  VITALS:  /70   Pulse 60   Temp 98 °F (36.7 °C) (Oral)   Resp 19   Ht 5' 6\" (1.676 m)   Wt 236 lb 6.4 oz (107.2 kg)   SpO2 96%   BMI 38.16 kg/m²     PHYSICAL EXAM:    Orientation:  alert and oriented to person, place and time    Right Upper Extremity    Dressing changed. Incision remains approximated. Thumb has black majority of replant portion. There appears to be some perfusion to the radial side of thumb just distal to sutures. Pulp with warmth. LABS:  CBC:   Lab Results   Component Value Date    WBC 9.4 12/10/2021    RBC 4.26 12/10/2021    HGB 13.2 12/10/2021    HCT 38.3 12/10/2021    MCV 89.9 12/10/2021    MCH 31.0 12/10/2021    MCHC 34.5 12/10/2021    RDW 12.3 12/10/2021     12/10/2021    MPV 9.6 12/10/2021     PTT (12-8-21) 61.7      ASSESSMENT: POD # 3 right thumb replantation      PLAN:  1:  Discontinue Heparin drip today. Plan for possible ASA therapy today: 325 mg twice a day  2:  May be transitioned to intermediate floor. 3:  If doing well and off heparin drip then he may be discharged home either later today or tomorrow  4:  Maintain dressings. Keep clean, dry, and intact  Pt will require higher dose pain medication secondary to severity of injury and difficulty controlling post operative pain    Electronically signed by Jailyn Goel DO on 12/10/2021 at 6:34 AM    I have seen and evaluated the patient and agree with the above assessment and plan on today's visit. I have performed the key components of the history and physical examination with significant findings of Discussed probable replant failure. Follow up office this Monday.  Patient discharged on Keflex, ASA, and Norco. I concur with the findings and plan as documented.     Ashtyn Ramirez MD  12/10/2021

## 2021-12-10 NOTE — HOME CARE
[facility] referral received, awaiting final orders. Spoke with patient's mother Saw Wilson and verified demographics. Plan to start care on 12/14/21. Will follow. Carlos Reaves LPN [facility].

## 2021-12-10 NOTE — PROGRESS NOTES
CC: Postoperative monitoring s/p right distal thumb replantation    ASSESSMENT:  POD-3-right thumb replantation    PLAN:  Orthopedic surgery planning on discharging patient home today      SIGNIFICANT 24 HOUR EVENTS/NEW COMPLAINTS:  12/9-complaining of mild pain with some throbbing. No other complaints  12/10-moderate pain after having his dressings changed this morning. Feels like the Ace wrap is too tight. PHYSICAL EXAM:  General -Young, pleasant, well developed, well-nourished white man  Neuro -awake, alert, attentive  Head/Face/Neck -WNL  CV -normal sinus rhythm  Pulm -nonlabored  Chest -WNL  Musculoskeletal -right hand and thumb splinted with visible pins.   Visible skin appears somewhat dusky  Skin -no rash

## 2021-12-10 NOTE — CARE COORDINATION
Care coordination  The patient is post op day #3 right thumb replantation and per orthopedic surgery the plan is for the patient to be discharged home today . The patient is having moderate pain after having his dressings changed. His right hand and thumb splinted with visible pins and the skin appears dusky. Plan to discontinue heparin gtt this am start po asa 325 mg po bid. Plan to transferred to Intermediate floor home later today or tomorrow. [facility] to follow the patient and they have no availability to see the patient until Next Tuesday December 14th  So I left a vm with Yecenia BROWN from [facility] await a call back. C9 will need done for home care. I will get that started. I also called To buzz Britton the cm at [employer] and she is off today and the new  is Uzair Macario I left her a vm to call me back and to find out if we have a claim # as yet. The main # is [phone].  I will follow

## 2021-12-10 NOTE — CARE COORDINATION
Referral made to Yecenia BROWN at [facility] and she will evaluate the patient and let me know if can accept. I will get the c9 done today still await the cm from [employer] comp to call me Cristian Hicks to get the claim # see my previous note. I will follow       Addendum 226 pm 12/10/21  Received a call from Yecenia at [facility] they cannot accept the patient due to staffing so I made a referral to [facility] to [facility] and their availabity starts Tuesday 12/14/21 as they are booked. I finished the c9 for home care for the patient and I faxed to Dayami Mccormick office fax [phone] and they where going to sign the form and forward to fax [phone] to [facility] [employer] still no claim # and no one has called me from the workers comp. I spoke to the rn at Dr Aniya Diaz office. [facility] is aware of this.  I will follow

## 2021-12-11 NOTE — CARE COORDINATION
Received call from [facility], they never received hhc orders. Called floor for hhc orders. Updated Guyla that hhc under nursing communication. Sana Joseph, MSW, LSW

## 2021-12-13 ENCOUNTER — OFFICE VISIT (OUTPATIENT)
Dept: ORTHOPEDIC SURGERY | Age: 26
End: 2021-12-13

## 2021-12-13 VITALS — RESPIRATION RATE: 18 BRPM | HEIGHT: 66 IN | WEIGHT: 225 LBS | BODY MASS INDEX: 36.16 KG/M2

## 2021-12-13 DIAGNOSIS — S68.011A AMPUTATION OF RIGHT THUMB, INITIAL ENCOUNTER: Primary | ICD-10-CM

## 2021-12-13 PROCEDURE — 99024 POSTOP FOLLOW-UP VISIT: CPT | Performed by: ORTHOPAEDIC SURGERY

## 2021-12-13 NOTE — PROGRESS NOTES
Department of Orthopedic Surgery  History and Physical      CHIEF COMPLAINT:  SP RIGHT  thumb replant     HISTORY OF PRESENT ILLNESS:                The patient is a 23 y.o. male who presents 5 days sp right thumb replant. States that he has been doing well since surgery, pain is moderately controlled. Denies any fevers, chill, n/v/d. Denies any new issues. Past Medical History:        Diagnosis Date    Asthma      Past Surgical History:        Procedure Laterality Date    FINGER SURGERY Right 12/7/2021    RIGHT THUMB REIMPLANTATION performed by Jacqueline Harvey MD at [place] [address]       Current Medications:   No current facility-administered medications for this visit. Allergies:  Patient has no known allergies. Social History:   TOBACCO:   reports that he has never smoked. He has never used smokeless tobacco.  ETOH:   reports no history of alcohol use. DRUGS:   reports no history of drug use. ACTIVITIES OF DAILY LIVING:    OCCUPATION:    Family History:   No family history on file.     REVIEW OF SYSTEMS:  CONSTITUTIONAL:  negative  EYES:  negative  HEENT:  negative  RESPIRATORY:  negative  CARDIOVASCULAR:  negative  GASTROINTESTINAL:  negative  GENITOURINARY:  negative  INTEGUMENT/BREAST:  negative  HEMATOLOGIC/LYMPHATIC:  negative  ALLERGIC/IMMUNOLOGIC:  negative  ENDOCRINE:  negative  MUSCULOSKELETAL:  negative  NEUROLOGICAL:  negative  BEHAVIOR/PSYCH:  negative    PHYSICAL EXAM:    VITALS:  Resp 18   Ht 5' 6\" (1.676 m)   Wt 225 lb (102.1 kg)   BMI 36.32 kg/m²   CONSTITUTIONAL:  awake, alert, cooperative, no apparent distress, and appears stated age  EYES:  Lids and lashes normal, pupils equal, round and reactive to light, extra ocular muscles intact, sclera clear, conjunctiva normal  ENT:  Normocephalic, without obvious abnormality, atraumatic, sinuses nontender on palpation, external ears without lesions, oral pharynx with moist mucus membranes, tonsils without erythema or exudates, gums normal and good dentition. NECK:  Supple, symmetrical, trachea midline, no adenopathy, thyroid symmetric, not enlarged and no tenderness, skin normal  LUNGS:  CTA  CARDIOVASCULAR:  2+ radial pulses, extremities warm and well perfused  ABDOMEN:  NTTP  CHEST:  Atraumatic   GENITAL/URINARY:  deferred  NEUROLOGIC:  Awake, alert, oriented to name, place and time. Cranial nerves II-XII are grossly intact. Motor is 5 out of 5 bilaterally. Sensory is intact.  gait is normal.  MUSCULOSKELETAL:    Right UE: Pins in place without signs of loosening or infection. Incision remains approximated without signs of infection. Thumb has black majority of replant portion. There appears to be some perfusion to the radial side of thumb just distal to sutures. Nail bed and pulp dry and black    DATA:    CBC:   Lab Results   Component Value Date    WBC 9.4 12/10/2021    RBC 4.26 12/10/2021    HGB 13.2 12/10/2021    HCT 38.3 12/10/2021    MCV 89.9 12/10/2021    MCH 31.0 12/10/2021    MCHC 34.5 12/10/2021    RDW 12.3 12/10/2021     12/10/2021    MPV 9.6 12/10/2021     PT/INR:    Lab Results   Component Value Date    PROTIME 10.9 10/02/2017    INR 1.0 10/02/2017       Radiology Review:  Xray of the right hand 3 views PA, oblique and lateral taken and reviewed today demonstrating stable pinning of the IPJ of the thumb without evidence of hardware failure or loss of reduction  Impression: Stable IPJ pinning of the thumb    IMPRESSION:  · SP right  thumb replant    PLAN:  Discussed findings with the patient. Discussed that the replant is not taking and although it doesn't seem infected at this time can become a source of infection. We will need to remove the tip and perform a Martell advancement with attempt to persevere as much length as possible to the thumb. Risks and benefits discussed with the patient as well as alternative and he would like to proceed.      I have seen and evaluated the patient and agree with the above assessment and plan on today's visit. I have performed the key components of the history and physical examination with significant findings of right  thumb gangrene following traumatic amputation with replantation. We will plan for revision amputation and primary closure with possible advancement flap as needed. Discussed with the patient wound healing complications, infection, phantom thumb pain as well as amputation site pain as well as a standard surgical risks and benefits terms of complications. All questions answered. I concur with the findings and plan as documented. I explained the risks, benefits, alternatives and complications of surgery with the patient including but not limited to the risks of infection, possible damage to nerves, vessels, or tendons, stiffness, loss of range of motion, scar sensitivity, wound healing complications, worsening symptoms, possible need for therapy, as well as the possible need further surgery and unanticipated complications. The patient voiced understanding and all questions were answered. The patient elected to proceed with surgical intervention.      Stacey Smith MD  12/13/2021

## 2021-12-14 ENCOUNTER — PREP FOR PROCEDURE (OUTPATIENT)
Dept: ORTHOPEDIC SURGERY | Age: 26
End: 2021-12-14

## 2021-12-14 RX ORDER — SODIUM CHLORIDE 9 MG/ML
INJECTION, SOLUTION INTRAVENOUS CONTINUOUS
Status: CANCELLED | OUTPATIENT
Start: 2021-12-14

## 2021-12-14 RX ORDER — SODIUM CHLORIDE 0.9 % (FLUSH) 0.9 %
5-40 SYRINGE (ML) INJECTION PRN
Status: CANCELLED | OUTPATIENT
Start: 2021-12-14

## 2021-12-14 RX ORDER — SODIUM CHLORIDE 9 MG/ML
25 INJECTION, SOLUTION INTRAVENOUS PRN
Status: CANCELLED | OUTPATIENT
Start: 2021-12-14

## 2021-12-14 RX ORDER — SODIUM CHLORIDE 0.9 % (FLUSH) 0.9 %
5-40 SYRINGE (ML) INJECTION EVERY 12 HOURS SCHEDULED
Status: CANCELLED | OUTPATIENT
Start: 2021-12-14

## 2021-12-14 NOTE — PROGRESS NOTES
[facility] PRE-ADMISSION TESTING INSTRUCTIONS    The Preadmission Testing patient is instructed accordingly using the following criteria (check applicable):    ARRIVAL INSTRUCTIONS:  [x] Parking the day of Surgery is located in the Main Entrance lot. Upon entering the door, make an immediate right to the surgery reception desk    [x] Bring photo ID and insurance card    [] Bring in a copy of Living will or Durable Power of  papers. [x] Please be sure to arrange for responsible adult to provide transportation to and from the hospital    [x] Please arrange for responsible adult to be with you for the 24 hour period post procedure due to having anesthesia      GENERAL INSTRUCTIONS:    [x] Nothing by mouth after midnight, including gum, candy, mints or water    [x] You may brush your teeth, but do not swallow any water    [x] Take medications as instructed with 1-2 oz of water    [] Stop herbal supplements and vitamins 5 days prior to procedure    [x] Follow preop dosing of blood thinners per physician instructions    [] Take 1/2 dose of evening insulin, but no insulin after midnight    [] No oral diabetic medications after midnight    [] If diabetic and have low blood sugar or feel symptomatic, take 1-2oz apple juice only    [] Bring inhalers day of surgery    [] Bring C-PAP/ Bi-Pap day of surgery    [] Bring urine specimen day of surgery    [x] Shower or bath with soap, lather and rinse well, AM of Surgery, no lotion, powders or creams to surgical site    [] Follow bowel prep as instructed per surgeon    [x] No tobacco products within 24 hours of surgery     [x] No alcohol or illegal drug use within 24 hours of surgery.     [x] Jewelry, body piercing's, eyeglasses, contact lenses and dentures are not permitted into surgery (bring cases)      [] Please do not wear any nail polish, make up or hair products on the day of surgery    [x] You can expect a call the business day prior to procedure to notify you if your arrival time changes    [] If you receive a survey after surgery we would greatly appreciate your comments    [] Parent/guardian of a minor must accompany their child and remain on the premises  the entire time they are under our care     [] Pediatric patients may bring favorite toy, blanket or comfort item with them    [] A caregiver or family member must remain with the patient during their stay if they are mentally handicapped, have dementia, disoriented or unable to use a call light or would be a safety concern if left unattended    [x] Please notify surgeon if you develop any illness between now and time of surgery (cold, cough, sore throat, fever, nausea, vomiting) or any signs of infections  including skin, wounds, and dental.    []  The Outpatient Pharmacy is available to fill your prescription here on your day of surgery, ask your preop nurse for details    [] Other instructions    EDUCATIONAL MATERIALS PROVIDED:    [] PAT Preoperative Education Packet/Booklet     [] Medication List    [] Transfusion bracelet applied with instructions    [] Shower with soap, lather and rinse well, and use CHG wipes provided the evening before surgery as instructed    [] Incentive spirometer with instructions        Have you been tested for COVID  Yes           Have you been told you were positive for COVID Yes  Have you had any known exposure to someone that is positive for COVID No  Do you have a cough                   No              Do you have shortness of breath No                 Do you have a sore throat            No                Are you having chills                    No                Are you having muscle aches. No                    Please come to the hospital wearing a mask and have your significant other wear a mask as well. Both of you should check your temperature before leaving to come here,  if it is 100 or higher please call [phone] for instruction.

## 2021-12-15 ENCOUNTER — ANESTHESIA (OUTPATIENT)
Dept: OPERATING ROOM | Age: 26
End: 2021-12-15
Payer: COMMERCIAL

## 2021-12-15 ENCOUNTER — ANESTHESIA EVENT (OUTPATIENT)
Dept: OPERATING ROOM | Age: 26
End: 2021-12-15
Payer: COMMERCIAL

## 2021-12-15 ENCOUNTER — HOSPITAL ENCOUNTER (OUTPATIENT)
Age: 26
Setting detail: OUTPATIENT SURGERY
Discharge: HOME | End: 2021-12-15
Attending: ORTHOPAEDIC SURGERY | Admitting: ORTHOPAEDIC SURGERY
Payer: COMMERCIAL

## 2021-12-15 VITALS
SYSTOLIC BLOOD PRESSURE: 139 MMHG | DIASTOLIC BLOOD PRESSURE: 72 MMHG | TEMPERATURE: 98.2 F | BODY MASS INDEX: 36.16 KG/M2 | WEIGHT: 225 LBS | HEART RATE: 75 BPM | OXYGEN SATURATION: 97 % | HEIGHT: 66 IN | RESPIRATION RATE: 18 BRPM

## 2021-12-15 VITALS
TEMPERATURE: 97.5 F | RESPIRATION RATE: 2 BRPM | OXYGEN SATURATION: 100 % | SYSTOLIC BLOOD PRESSURE: 138 MMHG | DIASTOLIC BLOOD PRESSURE: 84 MMHG

## 2021-12-15 DIAGNOSIS — S68.011A AMPUTATION OF RIGHT THUMB WITH COMPLICATION, INITIAL ENCOUNTER: Primary | ICD-10-CM

## 2021-12-15 LAB — SARS-COV-2, NAAT: NOT DETECTED

## 2021-12-15 PROCEDURE — 6360000002 HC RX W HCPCS: Performed by: NURSE ANESTHETIST, CERTIFIED REGISTERED

## 2021-12-15 PROCEDURE — 7100000011 HC PHASE II RECOVERY - ADDTL 15 MIN: Performed by: ORTHOPAEDIC SURGERY

## 2021-12-15 PROCEDURE — 88305 TISSUE EXAM BY PATHOLOGIST: CPT

## 2021-12-15 PROCEDURE — 3700000000 HC ANESTHESIA ATTENDED CARE: Performed by: ORTHOPAEDIC SURGERY

## 2021-12-15 PROCEDURE — 3600000012 HC SURGERY LEVEL 2 ADDTL 15MIN: Performed by: ORTHOPAEDIC SURGERY

## 2021-12-15 PROCEDURE — 2709999900 HC NON-CHARGEABLE SUPPLY: Performed by: ORTHOPAEDIC SURGERY

## 2021-12-15 PROCEDURE — 7100000010 HC PHASE II RECOVERY - FIRST 15 MIN: Performed by: ORTHOPAEDIC SURGERY

## 2021-12-15 PROCEDURE — 87635 SARS-COV-2 COVID-19 AMP PRB: CPT

## 2021-12-15 PROCEDURE — 3600000002 HC SURGERY LEVEL 2 BASE: Performed by: ORTHOPAEDIC SURGERY

## 2021-12-15 PROCEDURE — 26951 AMPUTATION OF FINGER/THUMB: CPT | Performed by: ORTHOPAEDIC SURGERY

## 2021-12-15 PROCEDURE — 6370000000 HC RX 637 (ALT 250 FOR IP): Performed by: ANESTHESIOLOGY

## 2021-12-15 PROCEDURE — 7100000000 HC PACU RECOVERY - FIRST 15 MIN: Performed by: ORTHOPAEDIC SURGERY

## 2021-12-15 PROCEDURE — 2580000003 HC RX 258: Performed by: NURSE ANESTHETIST, CERTIFIED REGISTERED

## 2021-12-15 PROCEDURE — 7100000001 HC PACU RECOVERY - ADDTL 15 MIN: Performed by: ORTHOPAEDIC SURGERY

## 2021-12-15 PROCEDURE — 3700000001 HC ADD 15 MINUTES (ANESTHESIA): Performed by: ORTHOPAEDIC SURGERY

## 2021-12-15 PROCEDURE — 6360000002 HC RX W HCPCS: Performed by: ANESTHESIOLOGY

## 2021-12-15 PROCEDURE — 2500000003 HC RX 250 WO HCPCS: Performed by: ORTHOPAEDIC SURGERY

## 2021-12-15 PROCEDURE — 88311 DECALCIFY TISSUE: CPT

## 2021-12-15 PROCEDURE — 2500000003 HC RX 250 WO HCPCS: Performed by: NURSE ANESTHETIST, CERTIFIED REGISTERED

## 2021-12-15 RX ORDER — PROMETHAZINE HYDROCHLORIDE 25 MG/ML
6.25 INJECTION, SOLUTION INTRAMUSCULAR; INTRAVENOUS PRN
Status: DISCONTINUED | OUTPATIENT
Start: 2021-12-15 | End: 2021-12-15 | Stop reason: HOSPADM

## 2021-12-15 RX ORDER — DEXAMETHASONE SODIUM PHOSPHATE 4 MG/ML
INJECTION, SOLUTION INTRA-ARTICULAR; INTRALESIONAL; INTRAMUSCULAR; INTRAVENOUS; SOFT TISSUE PRN
Status: DISCONTINUED | OUTPATIENT
Start: 2021-12-15 | End: 2021-12-15 | Stop reason: SDUPTHER

## 2021-12-15 RX ORDER — FENTANYL CITRATE 50 UG/ML
INJECTION, SOLUTION INTRAMUSCULAR; INTRAVENOUS PRN
Status: DISCONTINUED | OUTPATIENT
Start: 2021-12-15 | End: 2021-12-15 | Stop reason: SDUPTHER

## 2021-12-15 RX ORDER — ONDANSETRON 2 MG/ML
INJECTION INTRAMUSCULAR; INTRAVENOUS PRN
Status: DISCONTINUED | OUTPATIENT
Start: 2021-12-15 | End: 2021-12-15 | Stop reason: SDUPTHER

## 2021-12-15 RX ORDER — KETOROLAC TROMETHAMINE 30 MG/ML
INJECTION, SOLUTION INTRAMUSCULAR; INTRAVENOUS PRN
Status: DISCONTINUED | OUTPATIENT
Start: 2021-12-15 | End: 2021-12-15 | Stop reason: SDUPTHER

## 2021-12-15 RX ORDER — SODIUM CHLORIDE 9 MG/ML
INJECTION, SOLUTION INTRAVENOUS CONTINUOUS PRN
Status: DISCONTINUED | OUTPATIENT
Start: 2021-12-15 | End: 2021-12-15 | Stop reason: SDUPTHER

## 2021-12-15 RX ORDER — FENTANYL CITRATE 50 UG/ML
25 INJECTION, SOLUTION INTRAMUSCULAR; INTRAVENOUS EVERY 5 MIN PRN
Status: DISCONTINUED | OUTPATIENT
Start: 2021-12-15 | End: 2021-12-15 | Stop reason: HOSPADM

## 2021-12-15 RX ORDER — OXYCODONE HYDROCHLORIDE AND ACETAMINOPHEN 5; 325 MG/1; MG/1
1 TABLET ORAL EVERY 6 HOURS PRN
Qty: 28 TABLET | Refills: 0 | Status: SHIPPED | OUTPATIENT
Start: 2021-12-15 | End: 2021-12-27 | Stop reason: SDUPTHER

## 2021-12-15 RX ORDER — DIPHENHYDRAMINE HYDROCHLORIDE 50 MG/ML
12.5 INJECTION INTRAMUSCULAR; INTRAVENOUS
Status: DISCONTINUED | OUTPATIENT
Start: 2021-12-15 | End: 2021-12-15 | Stop reason: HOSPADM

## 2021-12-15 RX ORDER — PROPOFOL 10 MG/ML
INJECTION, EMULSION INTRAVENOUS PRN
Status: DISCONTINUED | OUTPATIENT
Start: 2021-12-15 | End: 2021-12-15 | Stop reason: SDUPTHER

## 2021-12-15 RX ORDER — CEFAZOLIN SODIUM 2 G/50ML
SOLUTION INTRAVENOUS PRN
Status: DISCONTINUED | OUTPATIENT
Start: 2021-12-15 | End: 2021-12-15 | Stop reason: SDUPTHER

## 2021-12-15 RX ORDER — MIDAZOLAM HYDROCHLORIDE 1 MG/ML
INJECTION INTRAMUSCULAR; INTRAVENOUS PRN
Status: DISCONTINUED | OUTPATIENT
Start: 2021-12-15 | End: 2021-12-15 | Stop reason: SDUPTHER

## 2021-12-15 RX ORDER — ACETAMINOPHEN 500 MG
500 TABLET ORAL ONCE
Status: COMPLETED | OUTPATIENT
Start: 2021-12-15 | End: 2021-12-15

## 2021-12-15 RX ORDER — LIDOCAINE HYDROCHLORIDE 20 MG/ML
INJECTION, SOLUTION EPIDURAL; INFILTRATION; INTRACAUDAL; PERINEURAL PRN
Status: DISCONTINUED | OUTPATIENT
Start: 2021-12-15 | End: 2021-12-15 | Stop reason: SDUPTHER

## 2021-12-15 RX ORDER — BUPIVACAINE HYDROCHLORIDE 5 MG/ML
INJECTION, SOLUTION EPIDURAL; INTRACAUDAL PRN
Status: DISCONTINUED | OUTPATIENT
Start: 2021-12-15 | End: 2021-12-15 | Stop reason: ALTCHOICE

## 2021-12-15 RX ORDER — CEPHALEXIN 500 MG/1
500 CAPSULE ORAL 4 TIMES DAILY
Qty: 40 CAPSULE | Refills: 0 | Status: SHIPPED | OUTPATIENT
Start: 2021-12-15 | End: 2021-12-25

## 2021-12-15 RX ORDER — SODIUM CHLORIDE 9 MG/ML
INJECTION, SOLUTION INTRAVENOUS CONTINUOUS
Status: DISCONTINUED | OUTPATIENT
Start: 2021-12-15 | End: 2021-12-15 | Stop reason: HOSPADM

## 2021-12-15 RX ORDER — SODIUM CHLORIDE 0.9 % (FLUSH) 0.9 %
5-40 SYRINGE (ML) INJECTION PRN
Status: DISCONTINUED | OUTPATIENT
Start: 2021-12-15 | End: 2021-12-15 | Stop reason: HOSPADM

## 2021-12-15 RX ORDER — SODIUM CHLORIDE 9 MG/ML
25 INJECTION, SOLUTION INTRAVENOUS PRN
Status: DISCONTINUED | OUTPATIENT
Start: 2021-12-15 | End: 2021-12-15 | Stop reason: HOSPADM

## 2021-12-15 RX ORDER — OXYCODONE HYDROCHLORIDE AND ACETAMINOPHEN 5; 325 MG/1; MG/1
1 TABLET ORAL PRN
Status: COMPLETED | OUTPATIENT
Start: 2021-12-15 | End: 2021-12-15

## 2021-12-15 RX ORDER — SODIUM CHLORIDE 0.9 % (FLUSH) 0.9 %
5-40 SYRINGE (ML) INJECTION EVERY 12 HOURS SCHEDULED
Status: DISCONTINUED | OUTPATIENT
Start: 2021-12-15 | End: 2021-12-15 | Stop reason: HOSPADM

## 2021-12-15 RX ORDER — MEPERIDINE HYDROCHLORIDE 25 MG/ML
12.5 INJECTION INTRAMUSCULAR; INTRAVENOUS; SUBCUTANEOUS EVERY 10 MIN PRN
Status: DISCONTINUED | OUTPATIENT
Start: 2021-12-15 | End: 2021-12-15 | Stop reason: HOSPADM

## 2021-12-15 RX ADMIN — ONDANSETRON 4 MG: 2 INJECTION INTRAMUSCULAR; INTRAVENOUS at 17:08

## 2021-12-15 RX ADMIN — LIDOCAINE HYDROCHLORIDE 100 MG: 20 INJECTION, SOLUTION EPIDURAL; INFILTRATION; INTRACAUDAL; PERINEURAL at 17:04

## 2021-12-15 RX ADMIN — ACETAMINOPHEN 500 MG: 500 TABLET ORAL at 14:01

## 2021-12-15 RX ADMIN — SODIUM CHLORIDE: 9 INJECTION, SOLUTION INTRAVENOUS at 16:53

## 2021-12-15 RX ADMIN — FENTANYL CITRATE 100 MCG: 50 INJECTION, SOLUTION INTRAMUSCULAR; INTRAVENOUS at 17:04

## 2021-12-15 RX ADMIN — PROPOFOL 180 MG: 10 INJECTION, EMULSION INTRAVENOUS at 17:04

## 2021-12-15 RX ADMIN — HYDROMORPHONE HYDROCHLORIDE 0.5 MG: 1 INJECTION, SOLUTION INTRAMUSCULAR; INTRAVENOUS; SUBCUTANEOUS at 18:34

## 2021-12-15 RX ADMIN — MIDAZOLAM 2 MG: 1 INJECTION INTRAMUSCULAR; INTRAVENOUS at 16:56

## 2021-12-15 RX ADMIN — OXYCODONE AND ACETAMINOPHEN 1 TABLET: 5; 325 TABLET ORAL at 18:54

## 2021-12-15 RX ADMIN — DEXAMETHASONE SODIUM PHOSPHATE 10 MG: 4 INJECTION, SOLUTION INTRAMUSCULAR; INTRAVENOUS at 17:08

## 2021-12-15 RX ADMIN — CEFAZOLIN SODIUM 2000 MG: 2 SOLUTION INTRAVENOUS at 17:07

## 2021-12-15 RX ADMIN — KETOROLAC TROMETHAMINE 30 MG: 30 INJECTION, SOLUTION INTRAMUSCULAR; INTRAVENOUS at 17:08

## 2021-12-15 ASSESSMENT — PULMONARY FUNCTION TESTS
PIF_VALUE: 0
PIF_VALUE: 16
PIF_VALUE: 16
PIF_VALUE: 0
PIF_VALUE: 16
PIF_VALUE: 16
PIF_VALUE: 0
PIF_VALUE: 16
PIF_VALUE: 31
PIF_VALUE: 15
PIF_VALUE: 16
PIF_VALUE: 16
PIF_VALUE: 17
PIF_VALUE: 16
PIF_VALUE: 0
PIF_VALUE: 16
PIF_VALUE: 3
PIF_VALUE: 16
PIF_VALUE: 15
PIF_VALUE: 16
PIF_VALUE: 16
PIF_VALUE: 1
PIF_VALUE: 16
PIF_VALUE: 3
PIF_VALUE: 16
PIF_VALUE: 17
PIF_VALUE: 15
PIF_VALUE: 17
PIF_VALUE: 16
PIF_VALUE: 16
PIF_VALUE: 5
PIF_VALUE: 1
PIF_VALUE: 16
PIF_VALUE: 16
PIF_VALUE: 4
PIF_VALUE: 16
PIF_VALUE: 17
PIF_VALUE: 0
PIF_VALUE: 2
PIF_VALUE: 16
PIF_VALUE: 16
PIF_VALUE: 17
PIF_VALUE: 16
PIF_VALUE: 1

## 2021-12-15 ASSESSMENT — LIFESTYLE VARIABLES: SMOKING_STATUS: 0

## 2021-12-15 ASSESSMENT — PAIN SCALES - GENERAL
PAINLEVEL_OUTOF10: 7
PAINLEVEL_OUTOF10: 7
PAINLEVEL_OUTOF10: 6

## 2021-12-15 ASSESSMENT — PAIN - FUNCTIONAL ASSESSMENT: PAIN_FUNCTIONAL_ASSESSMENT: 0-10

## 2021-12-15 NOTE — BRIEF OP NOTE
Brief Postoperative Note      Patient: Nimesh Amaya  YOB: 1995  MRN: [id number]    Date of Procedure: 12/15/2021    Pre-Op Diagnosis: RIGHT THUMB AMPUTATION    Post-Op Diagnosis: Same       Procedure(s):  RIGHT THUMB REVISION  AMPUTATION    Surgeon(s):  Colin Zimmer MD    Assistant:  Resident: Estefanía Rojo DO    Anesthesia: General    Estimated Blood Loss (mL): less than 50     Complications: None    Specimens:   ID Type Source Tests Collected by Time Destination   A : RIGHT THUMB  Tissue Tissue SURGICAL PATHOLOGY Colin Zimmer MD 12/15/2021 1719        Implants:  * No implants in log *      Drains:   [REMOVED] Urethral Catheter Non-latex 16 fr (Removed)   $ Urethral catheter insertion Inserted for procedure 12/08/21 0200   Catheter Indications Need for fluid volume management of the critically ill patient in a critical care setting; Perioperative use for selected surgical procedures 12/08/21 0600   Site Assessment No urethral drainage; Pink 12/08/21 0600   Urine Color Straw 12/08/21 0600   Urine Appearance Clear 12/08/21 0600   Output (mL) 125 mL 12/08/21 0600       Findings: see op note    Electronically signed by Estefanía Rojo DO on 12/15/2021 at 5:53 PM

## 2021-12-15 NOTE — ANESTHESIA POSTPROCEDURE EVALUATION
Department of Anesthesiology  Postprocedure Note    Patient: Rosales Whitaker  MRN: [id number]  YOB: 1995  Date of evaluation: 12/15/2021  Time:  6:03 PM     Procedure Summary     Date: 12/15/21 Room / Location: [address]    Anesthesia Start: 5834 Anesthesia Stop: 1803    Procedure: RIGHT THUMB REVISION  AMPUTATION (Right Fingers) Diagnosis: (RIGHT THUMB AMPUTATION)    Surgeons: Tera Slater MD Responsible Provider: Antoni Van MD    Anesthesia Type: general ASA Status: 3          Anesthesia Type: general    Dean Phase I: Dean Score: 8    Dean Phase II:      Last vitals: Reviewed and per EMR flowsheets.        Anesthesia Post Evaluation    Patient location during evaluation: PACU  Patient participation: complete - patient participated  Level of consciousness: awake and alert  Pain score: 0  Airway patency: patent  Nausea & Vomiting: no nausea and no vomiting  Complications: no  Cardiovascular status: hemodynamically stable  Respiratory status: acceptable  Hydration status: euvolemic  Multimodal analgesia pain management approach

## 2021-12-15 NOTE — H&P
Department of Orthopedic Surgery  History and Physical        CHIEF COMPLAINT:  SP RIGHT  thumb replant      HISTORY OF PRESENT ILLNESS:                 The patient is a 23 y.o. male who presents 5 days sp right thumb replant. States that he has been doing well since surgery, pain is moderately controlled. Denies any fevers, chill, n/v/d. Denies any new issues.      Past Medical History:    Past Medical History             Diagnosis Date    Asthma           Past Surgical History:    Past Surgical History             Procedure Laterality Date    FINGER SURGERY Right 12/7/2021     RIGHT THUMB REIMPLANTATION performed by So Sandoval MD at [facility]             Current Medications:   Current Hospital Medications   No current facility-administered medications for this visit. Allergies:  Patient has no known allergies.     Social History:   TOBACCO:   reports that he has never smoked. He has never used smokeless tobacco.  ETOH:   reports no history of alcohol use. DRUGS:   reports no history of drug use.   ACTIVITIES OF DAILY LIVING:    OCCUPATION:    Family History:   Family History   No family history on file.        REVIEW OF SYSTEMS:  CONSTITUTIONAL:  negative  EYES:  negative  HEENT:  negative  RESPIRATORY:  negative  CARDIOVASCULAR:  negative  GASTROINTESTINAL:  negative  GENITOURINARY:  negative  INTEGUMENT/BREAST:  negative  HEMATOLOGIC/LYMPHATIC:  negative  ALLERGIC/IMMUNOLOGIC:  negative  ENDOCRINE:  negative  MUSCULOSKELETAL:  negative  NEUROLOGICAL:  negative  BEHAVIOR/PSYCH:  negative     PHYSICAL EXAM:    VITALS:  Resp 18   Ht 5' 6\" (1.676 m)   Wt 225 lb (102.1 kg)   BMI 36.32 kg/m²   CONSTITUTIONAL:  awake, alert, cooperative, no apparent distress, and appears stated age  EYES:  Lids and lashes normal, pupils equal, round and reactive to light, extra ocular muscles intact, sclera clear, conjunctiva normal  ENT:  Normocephalic, without obvious abnormality, atraumatic, sinuses nontender on palpation, external ears without lesions, oral pharynx with moist mucus membranes, tonsils without erythema or exudates, gums normal and good dentition. NECK:  Supple, symmetrical, trachea midline, no adenopathy, thyroid symmetric, not enlarged and no tenderness, skin normal  LUNGS:  CTA  CARDIOVASCULAR:  2+ radial pulses, extremities warm and well perfused  ABDOMEN:  NTTP  CHEST:  Atraumatic   GENITAL/URINARY:  deferred  NEUROLOGIC:  Awake, alert, oriented to name, place and time. Cranial nerves II-XII are grossly intact. Motor is 5 out of 5 bilaterally. Sensory is intact.  gait is normal.  MUSCULOSKELETAL:     Right UE: Pins in place without signs of loosening or infection. Incision remains approximated without signs of infection. Thumb has black majority of replant portion.  There appears to be some perfusion to the radial side of thumb just distal to sutures. Nail bed and pulp dry and black     DATA:    CBC:         Lab Results   Component Value Date     WBC 9.4 12/10/2021     RBC 4.26 12/10/2021     HGB 13.2 12/10/2021     HCT 38.3 12/10/2021     MCV 89.9 12/10/2021     MCH 31.0 12/10/2021     MCHC 34.5 12/10/2021     RDW 12.3 12/10/2021      12/10/2021     MPV 9.6 12/10/2021      PT/INR:          Lab Results   Component Value Date     PROTIME 10.9 10/02/2017     INR 1.0 10/02/2017         Radiology Review:  Xray of the right hand 3 views PA, oblique and lateral taken and reviewed today demonstrating stable pinning of the IPJ of the thumb without evidence of hardware failure or loss of reduction  Impression: Stable IPJ pinning of the thumb     IMPRESSION:  · SP right  thumb replant     PLAN:  Discussed findings with the patient. Discussed that the replant is not taking and although it doesn't seem infected at this time can become a source of infection. We will need to remove the tip and perform a Martell advancement with attempt to persevere as much length as possible to the thumb.  Risks and benefits discussed with the patient as well as alternative and he would like to proceed.      I have seen and evaluated the patient and agree with the above assessment and plan on today's visit. I have performed the key components of the history and physical examination with significant findings of right  thumb gangrene following traumatic amputation with replantation. We will plan for revision amputation and primary closure with possible advancement flap as needed. Discussed with the patient wound healing complications, infection, phantom thumb pain as well as amputation site pain as well as a standard surgical risks and benefits terms of complications. All questions answered. I concur with the findings and plan as documented.     I explained the risks, benefits, alternatives and complications of surgery with the patient including but not limited to the risks of infection, possible damage to nerves, vessels, or tendons, stiffness, loss of range of motion, scar sensitivity, wound healing complications, worsening symptoms, possible need for therapy, as well as the possible need further surgery and unanticipated complications. The patient voiced understanding and all questions were answered. The patient elected to proceed with surgical intervention. History and Physical Update     Patient was seen and examined. Patient's history and physical was reviewed with the patient. There has been no significant interval changes.       Electronically signed by Carmel Guthrie MD on 12/15/2021 at 5:01 PM

## 2021-12-15 NOTE — ANESTHESIA PRE PROCEDURE
Department of Anesthesiology  Preprocedure Note       Name:  Marge Leon   Age:  23 y.o.  :  1995                                          MRN:  [id number]         Date:  12/15/2021      Surgeon: Angelique Lewis):  Jaclyn Gill MD    Procedure: Procedure(s):  RIGHT THUMB REVISION  AMPUTATION    Medications prior to admission:   Prior to Admission medications    Medication Sig Start Date End Date Taking? Authorizing Provider   aspirin 325 MG EC tablet Take 1 tablet by mouth 2 times daily 12/10/21 12/10/22 Yes Will B Beucler, DO   HYDROcodone-acetaminophen (NORCO) 5-325 MG per tablet Take 2 tablets by mouth every 6 hours as needed for Pain for up to 7 days. Intended supply: 7 days.  Take lowest dose possible to manage pain 12/10/21 12/17/21 Yes Will B Beucler, DO   cephALEXin (KEFLEX) 500 MG capsule Take 1 capsule by mouth 4 times daily for 10 days 12/10/21 12/20/21 Yes Will B Beucler, DO       Current medications:    Current Facility-Administered Medications   Medication Dose Route Frequency Provider Last Rate Last Admin    0.9 % sodium chloride infusion   IntraVENous Continuous TRAN Mcbride        0.9 % sodium chloride infusion  25 mL IntraVENous PRN TRAN Mcbride        ceFAZolin (ANCEF) 2000 mg in sterile water 20 mL IV syringe  2,000 mg IntraVENous On Call to [address]        sodium chloride flush 0.9 % injection 5-40 mL  5-40 mL IntraVENous 2 times per day TRAN Mcbride        sodium chloride flush 0.9 % injection 5-40 mL  5-40 mL IntraVENous PRN TRAN Mcbride           Allergies:  No Known Allergies    Problem List:    Patient Active Problem List   Diagnosis Code    Amputation of right thumb S68.011A    Amputation of right thumb with complication N71.534N       Past Medical History:        Diagnosis Date    Asthma        Past Surgical History:        Procedure Laterality Date    FINGER SURGERY Right 2021    RIGHT THUMB REIMPLANTATION performed by Augusto Cloud Desire Galdamez MD at [facility]         Social History:    Social History     Tobacco Use    Smoking status: Never Smoker    Smokeless tobacco: Never Used   Substance Use Topics    Alcohol use: No                                Counseling given: Not Answered      Vital Signs (Current):   Vitals:    12/14/21 1114   Weight: 225 lb (102.1 kg)   Height: 5' 6\" (1.676 m)                                              BP Readings from Last 3 Encounters:   12/10/21 133/88   12/08/21 (!) 164/104   01/04/21 116/74       NPO Status:                                                                                 BMI:   Wt Readings from Last 3 Encounters:   12/14/21 225 lb (102.1 kg)   12/13/21 225 lb (102.1 kg)   12/08/21 236 lb 6.4 oz (107.2 kg)     Body mass index is 36.32 kg/m². CBC:   Lab Results   Component Value Date    WBC 9.4 12/10/2021    RBC 4.26 12/10/2021    HGB 13.2 12/10/2021    HCT 38.3 12/10/2021    MCV 89.9 12/10/2021    RDW 12.3 12/10/2021     12/10/2021       CMP:   Lab Results   Component Value Date     12/09/2021    K 3.8 12/09/2021     12/09/2021    CO2 24 12/09/2021    BUN 9 12/09/2021    CREATININE 0.8 12/09/2021    GFRAA >60 12/09/2021    LABGLOM >60 12/09/2021    GLUCOSE 99 12/09/2021    PROT 6.9 12/09/2021    CALCIUM 9.5 12/09/2021    BILITOT 0.8 12/09/2021    ALKPHOS 47 12/09/2021    AST 15 12/09/2021    ALT 19 12/09/2021       POC Tests: No results for input(s): POCGLU, POCNA, POCK, POCCL, POCBUN, POCHEMO, POCHCT in the last 72 hours.     Coags:   Lab Results   Component Value Date    PROTIME 10.9 10/02/2017    INR 1.0 10/02/2017    APTT 36.7 12/10/2021       HCG (If Applicable): No results found for: PREGTESTUR, PREGSERUM, HCG, HCGQUANT     ABGs: No results found for: PHART, PO2ART, XTG3TMG, TBS5PKE, BEART, W4JIDRWG     Type & Screen (If Applicable):  No results found for: LABABO, LABRH    Drug/Infectious Status (If Applicable):  No results found for: HIV, HEPCAB    COVID-19 Screening (If Applicable):   Lab Results   Component Value Date    COVID19 Not Detected 12/08/2021    COVID19 Not Detected 11/24/2020           Anesthesia Evaluation  Patient summary reviewed and Nursing notes reviewed no history of anesthetic complications:   Airway: Mallampati: II  TM distance: >3 FB   Neck ROM: full  Mouth opening: > = 3 FB Dental: normal exam         Pulmonary: breath sounds clear to auscultation  (+) asthma:     (-) not a current smoker                           Cardiovascular:Negative CV ROS  Exercise tolerance: good (>4 METS),           Rhythm: regular  Rate: normal           Beta Blocker:  Not on Beta Blocker         Neuro/Psych:   Negative Neuro/Psych ROS              GI/Hepatic/Renal: Neg GI/Hepatic/Renal ROS            Endo/Other:                      ROS comment: R hand injury Abdominal:             Vascular: negative vascular ROS. Other Findings:           Anesthesia Plan      general     ASA 3       Induction: intravenous. Anesthetic plan and risks discussed with patient. Anuradha Lee MD   12/15/2021    Chart reviewed, patient seen and interviewed. Agree with note above.  Lindsey Torres, APRN - CRNA

## 2021-12-16 NOTE — OP NOTE
[address] [address]                                OPERATIVE REPORT    PATIENT NAME: Tyson Sharp                   :        1995  MED REC NO:   [id number]                            ROOM:  ACCOUNT NO:   [de-identified]                           ADMIT DATE: 12/15/2021  PROVIDER:     Sagar Gloria MD    DATE OF PROCEDURE:  12/15/2021    PREOPERATIVE DIAGNOSIS:  Right thumb gangrene after thumb replantation. POSTOPERATIVE DIAGNOSIS:  Right thumb gangrene after thumb replantation. PROCEDURES PERFORMED:  Right thumb revision amputation transphalangeal  to the distal aspect of the proximal phalanx. SURGEON:  Sagar Gloria M.D. ANESTHESIA:  1. General.  2.  Local anesthetic by surgeon consisting of approximately 10 mL of  0.25% Marcaine plain. ASSISTANTS:  ASSISTANT:  Roland Means DO, Orthopedic Surgery Resident. TOTAL TOURNIQUET TIME:  Approximately 10 minutes at 250 mmHg of brachial  tourniquet. ESTIMATED BLOOD LOSS:  Minimal.    FINDINGS:  1.  Gangrene without signs of infection distal to the previous  amputation site. 2.  Status post revision amputation through the distal aspect of the  proximal phalanx, primary closure was able to be achieved. SPECIMEN:  Amputated compartment was sent for Pathology. DISPOSITION:  The patient remained stable throughout the procedure. OPERATIVE INDICATIONS:  The patient is a 55-year-old gentleman who  sustained a traumatic amputation of his right thumb. He had undergone a  replantation with subsequent necrosis and gangrene. Risks, benefits,  alternatives, and complication of revision amputation were explained to  him including, but not limited to the risk of infection, damage to  nerves, vessels, or tendons, phantom finger pain, amputation site pain,  possible need for additional surgery, and unforeseen complications.   He  understood and wished to proceed. DESCRIPTION OF PROCEDURE:  The patient was identified in the holding  area. The right thumb was identified as the surgical site. He was then  seen by Anesthesia, taken to the operating room, placed supine on the  table, and underwent general anesthesia per Anesthesia Department. All  bony prominence were well padded. A well-padded arm tourniquet was  placed. The right upper extremity was prepared and draped in the  standard sterile fashion. He received perioperative antibiotics. Arm  was exsanguinated. Tourniquet was insufflated to 250 mmHg. Total  tourniquet time was approximately seven minutes. The previously placed  sutures were removed. He did have significant amount of venous  congestion dorsally. There was necrosis and dysvascularity palmarly. The previous repaired dorsal veins were incised and cauterized. The  extensive mechanism was severed. His radial neurovascular bundles were  explored. There was coagulation of the previously repaired artery. Neurectomy was completed over the repaired radial digital nerve. The  flexor pollicis longus tendon was incised and allowed to proximally  retract. With this completed, the pins were removed. The remaining  digital nerve ulnarly was severed and the part amputated. There was no  overt signs of infection. The wounds were copiously irrigated out. Tourniquet was deflated. Hemostasis was achieved with bipolar cautery. Revision amputation was made with an oscillating saw through the distal  aspect of the proximal phalanx. The soft tissues were protected and  through the metaphyseal flare, revision amputation was completed. This  was followed by contouring of the rongeur with copious irrigation. Flaps were then contoured to allow a primary closure using the ulnarly  based flap of tissue from the oblique amputation. _____ distal phalanx. This was contoured and inset with multiple Nylon sutures.   There was  healthy brisk capillary refill of the thumb flap. A bulky sterile  dressing was applied after digital block with 10 mL of 0.25% Marcaine  plain.         Leti Werner MD    D: 12/15/2021 19:01:56       T: 12/15/2021 23:53:01     [id number]  Job#: [id number]     Doc#: [id number]    CC:

## 2021-12-23 ENCOUNTER — TELEPHONE (OUTPATIENT)
Dept: ORTHOPEDIC SURGERY | Age: 26
End: 2021-12-23

## 2021-12-23 DIAGNOSIS — S68.011A AMPUTATION OF RIGHT THUMB, INITIAL ENCOUNTER: Primary | ICD-10-CM

## 2021-12-27 ENCOUNTER — OFFICE VISIT (OUTPATIENT)
Dept: ORTHOPEDIC SURGERY | Age: 26
End: 2021-12-27

## 2021-12-27 VITALS — WEIGHT: 225 LBS | HEIGHT: 66 IN | BODY MASS INDEX: 36.16 KG/M2 | RESPIRATION RATE: 18 BRPM

## 2021-12-27 DIAGNOSIS — S68.011A AMPUTATION OF RIGHT THUMB, INITIAL ENCOUNTER: Primary | ICD-10-CM

## 2021-12-27 DIAGNOSIS — S68.011A AMPUTATION OF RIGHT THUMB WITH COMPLICATION, INITIAL ENCOUNTER: ICD-10-CM

## 2021-12-27 PROCEDURE — 99024 POSTOP FOLLOW-UP VISIT: CPT | Performed by: ORTHOPAEDIC SURGERY

## 2021-12-27 RX ORDER — OXYCODONE HYDROCHLORIDE AND ACETAMINOPHEN 5; 325 MG/1; MG/1
1 TABLET ORAL EVERY 6 HOURS PRN
Qty: 28 TABLET | Refills: 0 | Status: SHIPPED | OUTPATIENT
Start: 2021-12-27 | End: 2022-01-03

## 2021-12-27 NOTE — PROGRESS NOTES
HPI: Patient presents today POD #12 s/p revision amputation. Overall he is doing well. He is out of pain medication and does need a refill. He has been taking ibuprofen. Physical Exam: incision c/d/i with sutures in place. No erythema or signs of infection. Hypersensitivity to the ulnar and radial digital nerves. Brisk capillary refill. Otherwise NVI. Xray: x-rays of the right hand were obtained today in the office and reviewed with the patient. 3 views: AP/lateral/oblique: demonstrate thumb amputation at level of IP joint. Appropriate soft tissue coverage. Impression: thumb amputation at level of IP joint. Assessment: POD #12 s/p revision amputation    Plan:   Patient to follow up Thursday for suture removal.  Patient referred to therapy for scar desensitization and ROM exercises. Did discuss in the future webbed space lengthening, thumb lengthening or prosthetic in the future. Follow up in 4 weeks  All questions and concerns answered. I have seen and evaluated the patient and agree with the above assessment and plan on today's visit. I have performed the key components of the history and physical examination with significant findings of postop. I concur with the findings and plan as documented.     Zofia Benjamin MD  12/27/2021

## 2021-12-28 NOTE — DISCHARGE SUMMARY
Physician Discharge Summary     Patient ID:  Lucy Buck  [id number]  11 y.o.  1995    Admit date: 12/7/2021    Discharge date and time: 12/10/2021  5:00 PM     Admitting Physician: Murray Dorantes MD     Discharge Physician: Murray Dorantes MD    Admission Diagnoses: Amputation of right thumb with complication, initial encounter [S68.011A]    Discharge Diagnoses: s/p right thumb replantation    Admission Condition: stable    Discharged Condition: stable    Hospital Course: The patient was admitted from the Emergency room. Pt sustained an amputation to the right thumb on 12/7 just proximal to the IP joint. He was given IV antiboitics and was urgently went to the OR for attempted replantation. After examination, review of radiologic studies, and appropriate pre-operative risk assessment, it was recommended by Murray Dorantes MD to undergo replantation of the right thumb. The patient underwent an uneventful course of right replantation by Murray Dorantes MD on 12/7/2021. The patient was subsequently taken to the SICU for close monitoring. His room remained above 70 degrees. He was instructed to avoid chocolate and caffeine to prevent vasoconstriction. He was started on a heparin drip. He was then transitioned to aspirin prior to discharge. The patient was continued on antibiotics and transitioned to oral antibiotics on discharge. Blood counts were followed daily. The patient's dressings were changed several times during his hospital stay. His dressing were changed just prior to discharge. There was some necrosis to the stump at that time but the thumb had no signs of infection.  was consulted for d/c planning. On POD 3, after the patient had made appropriate gains in regaining independent function the patient was discharged to home in stable condition with close follow up.     Treatments: s/p right thumb replantation    Disposition: The patient was provided instructions to continue aspirin as instructed, pain medication as prescribed, and oral antibiotics and to keep splint clean, dry, and intact. He was instructed not to remove dressing. .. The patient was to follow up with Lindsey Luo MD in 3-4 days in office, and to call the office for an appointment. Medication List      ASK your doctor about these medications    cephALEXin 500 MG capsule  Commonly known as: KEFLEX  Take 1 capsule by mouth 4 times daily for 10 days  Ask about: Should I take this medication? HYDROcodone-acetaminophen 5-325 MG per tablet  Commonly known as: Norco  Take 2 tablets by mouth every 6 hours as needed for Pain for up to 7 days. Intended supply: 7 days. Take lowest dose possible to manage pain  Ask about: Should I take this medication? Where to Get Your Medications      You can get these medications from any pharmacy    Bring a paper prescription for each of these medications  · cephALEXin 500 MG capsule  · HYDROcodone-acetaminophen 5-325 MG per tablet       Signed:   Candido Christianson DO  12/28/2021  8:20 AM

## 2021-12-30 ENCOUNTER — NURSE ONLY (OUTPATIENT)
Dept: ORTHOPEDIC SURGERY | Age: 26
End: 2021-12-30

## 2021-12-30 DIAGNOSIS — S68.011A AMPUTATION OF RIGHT THUMB, INITIAL ENCOUNTER: Primary | ICD-10-CM

## 2022-01-06 DIAGNOSIS — S68.011A AMPUTATION OF RIGHT THUMB WITH COMPLICATION, INITIAL ENCOUNTER: Primary | ICD-10-CM

## 2022-01-06 RX ORDER — HYDROCODONE BITARTRATE AND ACETAMINOPHEN 5; 325 MG/1; MG/1
1 TABLET ORAL EVERY 6 HOURS PRN
Qty: 28 TABLET | Refills: 0 | Status: SHIPPED | OUTPATIENT
Start: 2022-01-06 | End: 2022-01-13

## 2022-01-06 NOTE — TELEPHONE ENCOUNTER
Patient is requesting a medication refill, OARRS complete. Patient is 3 weeks out from surgery of Right thumb revision amputation. Patient was last seen on 12/27/21 and patients next appointment is 2/7/22. Patient was last given Percocet 5-325mg q6hr PRN on 12/27/21.

## 2022-01-07 DIAGNOSIS — S68.011A AMPUTATION OF RIGHT THUMB WITH COMPLICATION, INITIAL ENCOUNTER: Primary | ICD-10-CM

## 2022-01-18 ENCOUNTER — EVALUATION (OUTPATIENT)
Dept: OCCUPATIONAL THERAPY | Age: 27
End: 2022-01-18
Payer: COMMERCIAL

## 2022-01-18 DIAGNOSIS — S68.511A COMPLETE TRAUMATIC AMPUTATION OF RIGHT THUMB THROUGH PHALANX, INITIAL ENCOUNTER: Primary | ICD-10-CM

## 2022-01-18 PROCEDURE — 97530 THERAPEUTIC ACTIVITIES: CPT | Performed by: OCCUPATIONAL THERAPIST

## 2022-01-18 PROCEDURE — 97165 OT EVAL LOW COMPLEX 30 MIN: CPT | Performed by: OCCUPATIONAL THERAPIST

## 2022-01-18 NOTE — PROGRESS NOTES
OCCUPATIONAL THERAPY INITIAL EVALUATION    [address] THERAPY   Mason Guerra [address] [place] [id number]  Dept: [phone]  Loc: [phone]   Sylvie Castillo OT Fax: [phone]    Date:  2022  Initial Evaluation Date: 22     Evaluating Therapist: Melody Ambrose OT/L  [id number]    Patient Name:  Abigail Coronel    :  1995    Restrictions/Precautions:  ROM as tolerated, Low fall risk  Diagnosis:    S68.511A- Traumatic amputation R thumb thru phalanx   S68.011A (ICD-10-CM) - Amputation of right thumb with complication, initial encounter      Date of Surgery/Injury: R Amputation and replantation (21)  R Amputation revision d/t gangrene ()    Insurance/Certification information:   # [id number]  Plan of care signed (Y/N): N  Visit# / total visits:  ( C9 22 to 22)    Referring Practitioner:  Dr Herminia Bledsoe  Specific Practitioner Orders: OT evaluate and treat: : OT evaluate and treat: ROM, Scar desensitization and management.  Edema control, modalities prn  Frequency 3x week for 6 weeks     Assessment of current deficits   [] Functional mobility   [x] ADLs  [x] Strength   [] Cognition   [] Functional transfers   [x] IADLs  [] Safety Awareness  [] Endurance   [x] Fine Motor Coordination  [] Balance  [] Vision/perception  [x] Sensation    [] Gross Motor Coordination [x] ROM  [x] Pain   [x] Edema    [x] Scar Adhesion/Skin Integrity     OT PLAN OF CARE   OT POC based on physician orders, patient diagnosis and results of clinical assessment    Frequency/Duration: 2-3x/ week x 6 weeks  Specific OT Treatment to include    [x] Instruction in HEP                   Modalities:  [x] Therapeutic Exercise        [x] Ultrasound               [] Electrical Stimulation/Attended  [x] PROM/Stretching                    [x] Fluidotherapy          [x]  Paraffin                   [x] AAROM  [x] AROM                 [] Iontophoresis: [] Tendon Glides                                               [] Neuromuscular Re-Ed            [] ADL/IADL re-training    [x] Therapeutic Activity       [x] Pain Management with/without modalities PRN                 [x] Manual Therapy                      [] Splinting                      [x] Scar Management                   []Joint Protection/Training  []Ergonomics                             [] Joint Mobilization        [] Adaptive Equipment Assessment/Training                             [x] Manual Edema Mobilization   [] Myofascial Release                 [] Energy Conservation/Work Simplification  [x] GM/FM Coordination       [] Safety retraining/education per  individual diagnosis/goals  [x] Desensitization       Patient Specific Goal: \" To get better and get back to work. \"                             GOALS (Long term same as Short term):  1) Patient will demonstrate good understanding of home program (exercises/activities/diagnosis/prognosis/goals) with good accuracy. 2) Patient will demonstrate increased active/passive range of motion of their R thumb/ wrist to [facility] for ADL/IADL completion. 3) Patient will demonstrate  /pinch strength of at least 50# / 5 pinch pounds of their R hand. 4) Patient to report decreased pain in their affected r distal upper extremity from 4-6/10 with light movement  to 3/10 or less with resistive functional use. 5) Increase in fine motor function as evidenced by <25 sec to complete 9-hole peg test with the right hand. 6) Patient to report a decrease in hypersensitivity from 90% to 20% or less in their R thumb. 7) Patient to demonstrate decreased guarding of their affected extremity from 75% to 20% or less. 8) Patient will be knowledgeable of edema control techniques as evident with decreases from moderate to mild/none. 9) Patient will demonstrate a non-tender/non-adherent scar.    10) Patient will report ADL/ IADL functions as I using the right thumb/ hand.   11) Patient will decrease QuickDASH score to 30% or less for increased participation in daily functional activities. Past Medical History:   Past Medical History:   Diagnosis Date    Asthma      Past Surgical History:   Past Surgical History:   Procedure Laterality Date    FINGER AMPUTATION Right 12/15/2021    RIGHT THUMB REVISION  AMPUTATION performed by Jose Huff MD at [address] [place] [address] Right 12/7/2021    RIGHT THUMB REIMPLANTATION performed by Jose Huff MD at [facility]         Reason for Referral:    Patient  presents with a Hx of traumatic right thumb amputation.  Patient states that he was working and cleaning a razor blade like saw when it spun off and cut off part of his finger.  He noted immediate pain and deformity to the affected digit.  He immediately rinsed his hand in alcohol and put the amputated part of his right thumb into a plastic bag in order to take the emergency department.  This amputated part of the finger was then placed into an eye solution soaked in saline soaked gauze.  The patient is wishing to proceed with replantation. DATE OF PROCEDURE:  12/07/2021     PREOPERATIVE DIAGNOSIS:  Right thumb amputation at the level of  interphalangeal joint.     POSTOPERATIVE DIAGNOSIS:  Right thumb amputation at the level of  interphalangeal joint.     OPERATIONS PERFORMED:  1. Right thumb excisional debridement and removal of fracture  fragments, proximal and distal phalanges. 2.  Replantation of right thumb with repair of both radial and ulnar  digital nerves with use of intraoperative microscope. 3.  Repair of ulnar digital artery of thumb with use of intraoperative  Microscope.       DATE OF PROCEDURE:  12/15/2021     PREOPERATIVE DIAGNOSIS:  Right thumb gangrene after thumb replantation.     POSTOPERATIVE DIAGNOSIS:  Right thumb gangrene after thumb replantation.     PROCEDURES PERFORMED:  Right thumb revision amputation transphalangeal  to the distal aspect of the proximal phalanx. Pt cc: pain, hypersensitivity and decreased functional use of the right hand/ thumb. Moderate swelling observed. The scar is well healed but is very tight and sensitive to touch.      Home Living: Living with his parents  Prior Level of Function: Independent    Cognition:   Alert/Oriented x3     IADL STATUS:   Ind Mod I Min A Mod A Max A Dep Other   Homemaking Responsibility   x    A laundry/ some cooking   Shopping Responsibility:   x       Mode of Transportation: car       Diff with ignition   Leisure & Hobbies: NA       None reported   Work:       x Full time- on medical leave     Comments: Pt completes most daily activity with over use of the non-involved fingers on the right hand. The thumb is help in protective extension. Pt completes most tasks with compensation for the thumb but all tasks require increased effort/ time. Unable to shovel snow, open containers or lift moderate/ heavy items with the right hand. ADL STATUS:   Ind Mod I Min A Mod A Max A Dep Other   Feeding:  x        Grooming:  x        Bathing:  x        UE Dressing:  x        LE Dressing:  x        Toileting:  x        Transfers: x           Comments:  Fasteners are difficult. Uses left for pet care. Pain Level: 4-6 on scale of 1-10, stinging, pins and needles,  tight (pulling) and uncomfortable    UE Assessment: RHD    R UE AROM:            1-18-22     Wrist flexion   0-55     Wrist extension  0-65      RD  0-18      UD  0-42      Thumb palmar ABD  0-53      Thumb radial ext  0-50      MP  0-28      CMC  0-30      Opposition  intact to index only     Comments: AROM in the  R hand non-involved digits is very good. Sensation: R  All light touch and deep touch is intact for the right hand . However, paresthesias and significant hypersensitivity in the right thumb is present.      Edema Description/Circumferential Measurements:   X thumb/ 3.9    Dynamometer (setting 2): TBA         Pinch Meter: TBA     9 Hole Peg Test:   Right: (out only) 15 sec with decreased manipulation skills/ pt not able to place pegs in    QuickDASH Score: 48% disability    Intervention: Tx started with a focus on:   - retrograde massage   - thumb AROM (MP, opposition, radial ext and palmar ABD)   - Scar massage  Pt was encouraged to start home treatment at 2-3x/ day to start with a gradual increase as tolerated. Pt was reminded to avoid pain but to stimulate and move the thumb often. Will monitor and advance as tolerated. (therapeutic activity- 15 min)     Eval Complexity: low  Profile and History-MD notes, op notes, hospital notes  Assessment of Occupational Performance and Identification of Deficits- 9 performance deficits   Clinical Decision Making- modifications in testing/ no co-morbidities    Rehab Potential:                                 [x] Good  [] Fair  [] Poor        Suggested Professional Referral:       [x] No  [] Yes:  Barriers to Goal Achievement[de-identified]          [x] No  [] Yes:  Domestic Concerns:                           [x] No  [] Yes:       Patient. Education:  [x] Plans/Goals, Risks/Benefits discussed  [x] Home exercise program  Method of Education: [x] Verbal  [x] Demo  [x] Written  Comprehension of Education:  [x] Verbalizes understanding. [x] Demonstrates understanding. [] Needs Review. [] Demonstrates/verbalizes understanding of HEP/Ed previously given.         Patient understands diagnosis/prognosis and consents to treatment, plan and goals:   [x] Yes    [] No     Time In: 0910            Time Out: 1000                      Timed Code Treatment Minutes: 50 minutes      CODE  Minutes  Units   79244 OT Eval Low 35 1   67944 OT Eval Medium     93674 OT Eval High     34689 Fluidotherapy     52069 Manual     79415 Therapeutic Ex     97796 Therapeutic Activity 15 1   50923 ADL/COMP Tech Train     [id number] Neuromuscular Re-Ed     [id number] OrthoManagementTraining     [id number] [facility]     80025 Electrical Stim - Attended     [id number] Iontophoresis     [id number] Ultrasound      Other                Electronically signed by: Mata Melendez, OT/L  [id number]      [id number] Certification / Comments      Frequency/Duration 2-3x / week for 18 visits. Certification period From: 1-18-22  To: 4-18-22     I have reviewed the Plan of Care established for skilled therapy services and certify that the services are required and that they will be provided while the patient is under my care. Physician's Comments/Revisions:           Physicians's Printed Name:  Dr Purnima Finch                                   Physician's Signature:                                                               Date:      Please review Patient's OT evaluation and if you agree sign/date and fax back to us at our [facility] Fax: [phone].  Thank you for your referral!

## 2022-01-19 ENCOUNTER — TREATMENT (OUTPATIENT)
Dept: OCCUPATIONAL THERAPY | Age: 27
End: 2022-01-19
Payer: COMMERCIAL

## 2022-01-19 DIAGNOSIS — S68.511A COMPLETE TRAUMATIC AMPUTATION OF RIGHT THUMB THROUGH PHALANX, INITIAL ENCOUNTER: Primary | ICD-10-CM

## 2022-01-19 PROCEDURE — 97140 MANUAL THERAPY 1/> REGIONS: CPT | Performed by: OCCUPATIONAL THERAPIST

## 2022-01-19 PROCEDURE — 97110 THERAPEUTIC EXERCISES: CPT | Performed by: OCCUPATIONAL THERAPIST

## 2022-01-19 PROCEDURE — 97022 WHIRLPOOL THERAPY: CPT | Performed by: OCCUPATIONAL THERAPIST

## 2022-01-19 PROCEDURE — 97530 THERAPEUTIC ACTIVITIES: CPT | Performed by: OCCUPATIONAL THERAPIST

## 2022-01-19 NOTE — PROGRESS NOTES
[facility] [address]  [facility] [address]  Dept: [address] [place] [address] [address]: [phone]   [facility] OT Fax: [phone]    OCCUPATIONAL THERAPY PROGRESS NOTE    Date:  2022  Initial Evaluation Date: 22    Patient Name:  Taye Cortez    :  1995    Restrictions/Precautions:  ROM as tolerated, Low fall risk  Diagnosis:    S68.511A- Traumatic amputation R thumb thru phalanx         S68.011A (ICD-10-CM) - Amputation of right thumb with complication, initial encounter                                          Date of Surgery/Injury: R Amputation and replantation (21)  R Amputation revision d/t gangrene (12-15-21)     Insurance/Certification information:   # [id number]  Plan of care signed (Y/N): N  Visit# / total visits:  ( C9 22 to 22)     Referring Practitioner:  Dr Silvia Mcdaniels  Specific Practitioner Orders: OT evaluate and treat: : OT evaluate and treat: ROM, Scar desensitization and management. Edema control, modalities prn  Frequency 3x week for 6 weeks      Assessment of current deficits   []? Functional mobility             [x]? ADLs          [x]? Strength                  []? Cognition   []? Functional transfers           [x]? IADLs         []? Safety Awareness  []? Endurance   [x]? Fine Motor Coordination    []? Balance      []? Vision/perception   [x]? Sensation     []? Gross Motor Coordination [x]? ROM           [x]? Pain                        [x]? Edema          [x]? Scar Adhesion/Skin Integrity      OT PLAN OF CARE   OT POC based on physician orders, patient diagnosis and results of clinical assessment     Frequency/Duration: 2-3x/ week x 6 weeks  Specific OT Treatment to include     [x]? Instruction in HEP                   Modalities:  [x]?  Therapeutic Exercise                 [x]? Ultrasound               []? Electrical Stimulation/Attended  [x]? PROM/Stretching                    [x]? Fluidotherapy          [x]?   Paraffin                   [x]? AAROM  [x]?  AROM                 []? Iontophoresis:   []? Tendon Brett Mancini  []? Neuromuscular Re-Ed            []? ADL/IADL re-training    [x]?  Therapeutic Activity                  [x]? Pain Management with/without modalities PRN                 [x]?  Manual Therapy                      []? Splinting                                   [x]? Scar Management                   []?Joint Protection/Training  []? Ergonomics                             []? Joint Mobilization                      []? Adaptive Equipment Assessment/Training                             [x]?  Manual Edema Mobilization   []? Myofascial Release                 []? Energy Conservation/Work Simplification  [x]? GM/FM Coordination                []? Safety retraining/education per  individual diagnosis/goals  [x]? Desensitization        Patient Specific Goal: \" To get better and get back to work. \"                             GOALS (Long term same as Short term):  1) Patient will demonstrate good understanding of home program (exercises/activities/diagnosis/prognosis/goals) with good accuracy. 2) Patient will demonstrate increased active/passive range of motion of their R thumb/ wrist to [facility] for ADL/IADL completion. 3) Patient will demonstrate  /pinch strength of at least 50# / 5 pinch pounds of their R hand. 4) Patient to report decreased pain in their affected r distal upper extremity from 4-6/10 with light movement  to 3/10 or less with resistive functional use. 5) Increase in fine motor function as evidenced by <25 sec to complete 9-hole peg test with the right hand. 6) Patient to report a decrease in hypersensitivity from 90% to 20% or less in their R thumb. 7) Patient to demonstrate decreased guarding of their affected extremity from 75% to 20% or less.    8) Patient will be knowledgeable of edema control techniques as evident with decreases from moderate to mild/none. 9) Patient will demonstrate a non-tender/non-adherent scar. 10) Patient will report ADL/ IADL functions as I using the right thumb/ hand. 11) Patient will decrease QuickDASH score to 30% or less for increased participation in daily functional activities.      Pain Level: 4 on scale of 1-10, sore, tingling/ numb, radiating, tight/pulling in the R thumb. Subjective: Pt presents with no new complaints. Objective:  Updated POC to be completed by 2-18-22. INTERVENTION: COMPLETED: SPECIFICS/COMMENTS:   Modality:     Fluidotherapy R hand with AROM x  10 min for desensitization and to decrease joint stiffness        AROM:     Wrist AROM x - AROM all planes   Thumb AROM X  x - thumb AROM ( extension, ABD)  - opposition ex with pom poms   AAROM:               PROM/Stretching:     Thumb PROM X  x ABD/ADD, ext,   -opposition with pom poms (able to index/ middle and ring fingers)        Scar Mass/Edema Control:     Retrograde massage x R radial hand/ palm        Strengthening:               Other:     Desensitization x - beans and rice bins, tactile sensory ex/ challenging          Assessment/Comments: Pt is making Fair + progress toward stated plan of care. ROM, edema control and desensitization completed today with hand tenderness and nerve sensitivity. Swelling in the thumb is improving. Pt was provided with a finger protector to also help to protect when using the hand. Will continue.     -Rehab Potential: Good  -Requires OT Follow Up: Yes  Time In:1500            Time Out: 5590             Visit #: 2    Treatment Charges: Mins Units   Modalities: FL 10 1   Ther Exercise 20 1   Manual Therapy 10 1   Thera Activities 15 1   ADL/Home Mgt      Neuro Re-education     Gait Training     Group Therapy     Non-Billable Service Time     Other     Total Time/Units 55 4       -Response to Treatment: Pt is motivated for recovery.    Goals: Goals for pt can be seen on initial eval occurring on 1-19-22    Plan: [x]  Continue Plan of care: Treatment covered based on POC and graduated to patient's progress. Pt education continues at each visit to obtain maximum benefits from skilled OT intervention.   []  [facility] of care:   []  Discharge:      Rae Gandhi OT/L  [id number]

## 2022-01-21 ENCOUNTER — TREATMENT (OUTPATIENT)
Dept: OCCUPATIONAL THERAPY | Age: 27
End: 2022-01-21
Payer: COMMERCIAL

## 2022-01-21 DIAGNOSIS — S68.511A COMPLETE TRAUMATIC AMPUTATION OF RIGHT THUMB THROUGH PHALANX, INITIAL ENCOUNTER: Primary | ICD-10-CM

## 2022-01-21 PROCEDURE — 97530 THERAPEUTIC ACTIVITIES: CPT | Performed by: OCCUPATIONAL THERAPIST

## 2022-01-21 PROCEDURE — 97022 WHIRLPOOL THERAPY: CPT | Performed by: OCCUPATIONAL THERAPIST

## 2022-01-21 PROCEDURE — 97140 MANUAL THERAPY 1/> REGIONS: CPT | Performed by: OCCUPATIONAL THERAPIST

## 2022-01-21 PROCEDURE — 97110 THERAPEUTIC EXERCISES: CPT | Performed by: OCCUPATIONAL THERAPIST

## 2022-01-21 NOTE — PROGRESS NOTES
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 Edema control, modalities prn  Frequency 3x week for 6 weeks      Assessment of current deficits   []? Functional mobility             [x]? ADLs          [x]? Strength                  []? Cognition   []? Functional transfers           [x]? IADLs         []? Safety Awareness  []? Endurance   [x]? Fine Motor Coordination    []? Balance      []? Vision/perception   [x]? Sensation     []? Gross Motor Coordination [x]? ROM           [x]? Pain                        [x]? Edema          [x]? Scar Adhesion/Skin Integrity      OT PLAN OF CARE   OT POC based on physician orders, patient diagnosis and results of clinical assessment     Frequency/Duration: 2-3x/ week x 6 weeks  Specific OT Treatment to include     [x]? Instruction in HEP                   Modalities:  [x]?  Therapeutic Exercise                 [x]? Ultrasound               []? Electrical Stimulation/Attended  [x]? PROM/Stretching                    [x]? Fluidotherapy          [x]?   Paraffin                   [x]? AAROM  [x]?  AROM                 []? Iontophoresis:   []? Tendon [place]  []? Neuromuscular Re-Ed            []? ADL/IADL re-training    [x]?  Therapeutic Activity                  [x]? Pain Management with/without modalities PRN                 [x]?  Manual Therapy                      []? Splinting                                   [x]? Scar Management                   []?Joint Protection/Training  []? Ergonomics                             []? Joint Mobilization                      []? Adaptive Equipment Assessment/Training                             [x]?  Manual Edema Mobilization   []? Myofascial Release                 []? Energy Conservation/Work Simplification  [x]? GM/FM Coordination                []? Safety retraining/education per  individual diagnosis/goals  [x]? Desensitization        Patient Specific Goal: \" To get better and get back to work. \"                             GOALS (Long term same as Short term):  1) Patient will demonstrate good understanding of home program (exercises/activities/diagnosis/prognosis/goals) with good accuracy. 2) Patient will demonstrate increased active/passive range of motion of their R thumb/ wrist to [facility] for ADL/IADL completion. 3) Patient will demonstrate  /pinch strength of at least 50# / 5 pinch pounds of their R hand. 4) Patient to report decreased pain in their affected r distal upper extremity from 4-6/10 with light movement  to 3/10 or less with resistive functional use. 5) Increase in fine motor function as evidenced by <25 sec to complete 9-hole peg test with the right hand. 6) Patient to report a decrease in hypersensitivity from 90% to 20% or less in their R thumb. 7) Patient to demonstrate decreased guarding of their affected extremity from 75% to 20% or less.    8) Patient will be knowledgeable of edema control techniques as evident with decreases from moderate to mild/none. 9) Patient will demonstrate a non-tender/non-adherent scar. 10) Patient will report ADL/ IADL functions as I using the right thumb/ hand. 11) Patient will decrease QuickDASH score to 30% or less for increased participation in daily functional activities.      Pain Level: 5-6 on scale of 1-10, sore, tingling/ numb, radiating, tight/pulling in the R thumb. Subjective: \" My thumb hurts more today. \"  Objective:  Updated POC to be completed by 2-18-22. INTERVENTION: COMPLETED: SPECIFICS/COMMENTS:   Modality:     Fluidotherapy R hand with AROM x  10 min for desensitization and to decrease joint stiffness        AROM:     Wrist AROM x - AROM all planes   Thumb AROM X  x - thumb AROM ( extension, ABD)  - alternating opposition ex with pom poms   AAROM:               PROM/Stretching:     Thumb PROM X   ABD/ADD, ext,           Scar Mass/Edema Control:     Retrograde massage x R radial hand/ palm        Strengthening:               Other:     Desensitization  - beans and rice bins, tactile sensory ex/ challenging          Assessment/Comments: Pt is making Fair + progress toward stated plan of care. Tx continued today with less focus on desensitization. ROM is improving. Pt was cautioned not to over do it with his home ex.     -Rehab Potential: Good  -Requires OT Follow Up: Yes  Time In:1505            Time Out: 3023             Visit #: 3    Treatment Charges: Mins Units   Modalities: FL 10 1   Ther Exercise 20 1   Manual Therapy 10 1   Thera Activities 10 1   ADL/Home Mgt      Neuro Re-education     Gait Training     Group Therapy     Non-Billable Service Time     Other     Total Time/Units 50 4       -Response to Treatment: Pt feels he is getting better but is concerned about the recent increase in discomfort. The normal expected course of rehab was explained and support provided.    Goals: Goals for pt can be seen on initial eval occurring on 1-19-22    Plan:   [x]  Continue Plan of care: Treatment covered based on POC and graduated to patient's progress. Pt education continues at each visit to obtain maximum benefits from skilled OT intervention.   []  [facility] of care:   []  Discharge:      Clarita Goodell, OT/JAMARCUS  [id number]

## 2022-01-24 ENCOUNTER — TREATMENT (OUTPATIENT)
Dept: OCCUPATIONAL THERAPY | Age: 27
End: 2022-01-24
Payer: COMMERCIAL

## 2022-01-24 DIAGNOSIS — S68.511A COMPLETE TRAUMATIC AMPUTATION OF RIGHT THUMB THROUGH PHALANX, INITIAL ENCOUNTER: Primary | ICD-10-CM

## 2022-01-24 PROCEDURE — 97140 MANUAL THERAPY 1/> REGIONS: CPT | Performed by: OCCUPATIONAL THERAPIST

## 2022-01-24 PROCEDURE — 97530 THERAPEUTIC ACTIVITIES: CPT | Performed by: OCCUPATIONAL THERAPIST

## 2022-01-24 PROCEDURE — 97022 WHIRLPOOL THERAPY: CPT | Performed by: OCCUPATIONAL THERAPIST

## 2022-01-24 PROCEDURE — 97110 THERAPEUTIC EXERCISES: CPT | Performed by: OCCUPATIONAL THERAPIST

## 2022-01-24 NOTE — PROGRESS NOTES
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 Edema control, modalities prn  Frequency 3x week for 6 weeks      Assessment of current deficits   []? Functional mobility             [x]? ADLs          [x]? Strength                  []? Cognition   []? Functional transfers           [x]? IADLs         []? Safety Awareness  []? Endurance   [x]? Fine Motor Coordination    []? Balance      []? Vision/perception   [x]? Sensation     []? Gross Motor Coordination [x]? ROM           [x]? Pain                        [x]? Edema          [x]? Scar Adhesion/Skin Integrity      OT PLAN OF CARE   OT POC based on physician orders, patient diagnosis and results of clinical assessment     Frequency/Duration: 2-3x/ week x 6 weeks  Specific OT Treatment to include     [x]? Instruction in HEP                   Modalities:  [x]?  Therapeutic Exercise                 [x]? Ultrasound               []? Electrical Stimulation/Attended  [x]? PROM/Stretching                    [x]? Fluidotherapy          [x]?   Paraffin                   [x]? AAROM  [x]?  AROM                 []? Iontophoresis:   []? Tendon [place]  []? Neuromuscular Re-Ed            []? ADL/IADL re-training    [x]?  Therapeutic Activity                  [x]? Pain Management with/without modalities PRN                 [x]?  Manual Therapy                      []? Splinting                                   [x]? Scar Management                   []?Joint Protection/Training  []? Ergonomics                             []? Joint Mobilization                      []? Adaptive Equipment Assessment/Training                             [x]?  Manual Edema Mobilization   []? Myofascial Release                 []? Energy Conservation/Work Simplification  [x]? GM/FM Coordination                []? Safety retraining/education per  individual diagnosis/goals  [x]? Desensitization        Patient Specific Goal: \" To get better and get back to work. \"                             GOALS (Long term same as Short term):  1) Patient will demonstrate good understanding of home program (exercises/activities/diagnosis/prognosis/goals) with good accuracy. 2) Patient will demonstrate increased active/passive range of motion of their R thumb/ wrist to [facility] for ADL/IADL completion. 3) Patient will demonstrate  /pinch strength of at least 50# / 5 pinch pounds of their R hand. 4) Patient to report decreased pain in their affected r distal upper extremity from 4-6/10 with light movement  to 3/10 or less with resistive functional use. 5) Increase in fine motor function as evidenced by <25 sec to complete 9-hole peg test with the right hand. 6) Patient to report a decrease in hypersensitivity from 90% to 20% or less in their R thumb. 7) Patient to demonstrate decreased guarding of their affected extremity from 75% to 20% or less.    8) Patient will be knowledgeable of edema control techniques as evident with decreases from moderate to mild/none. 9) Patient will demonstrate a non-tender/non-adherent scar. 10) Patient will report ADL/ IADL functions as I using the right thumb/ hand. 11) Patient will decrease QuickDASH score to 30% or less for increased participation in daily functional activities.      Pain Level: 5- on scale of 1-10, sore, tingling/ numb, radiating, tight/pulling in the R thumb. After fluiotherapy ^'ed 6/10. Subjective: \" In think this cold really bothers my thumb . \"  Objective:  Updated POC to be completed by 2-18-22. INTERVENTION: COMPLETED: SPECIFICS/COMMENTS:   Modality:     Fluidotherapy R hand with AROM x  10 min for desensitization and to decrease joint stiffness- try lower heat next time maybe 105* 2* to ^'ed pain and swelling. AROM:     Wrist AROM x - AROM all planes   Thumb AROM X    X      x    x   - thumb AROM ( extension, ABD, MP flexion)  - opposition ex with pom poms to IF, MF and RF only - over extend upon release. - string disc's, holding string with R - 16 pieces.   -working MP flexion - touch peg therapist holds in his palm - able to gradually move peg-  to move further 15 rep's    AAROM:               PROM/Stretching:     Thumb PROM X   ABD/ADD, ext, MP flexion   -opposition with pom poms (able to index/ middle and ring fingers)-         Scar Mass/Edema Control:     Retrograde massage X        x - R radial hand/ palm- beginning and end of session  -x lg gel finger sleeve - lost it - looking for   - fitted with mesh sleeve twisted at end and doubled. Wear as needed        Strengthening:               Other:     Desensitization x - beans and rice bins, tactile sensory ex/ challenging,  Fluiotherapy. Rice bin- removed items only - too painful           Assessment/Comments: Pt is making Fair + progress toward stated plan of care. ROM, edema control and desensitization completed today with hand tenderness and nerve sensation.   PT c/0 his thumb hurting more aftger coming out of the flioutherapy - feeling \"thicker\". Therapist stated we may keep the heat lower next time in case his swelling ^'es while in the fluiotherapy. Pt was provided with a finger protector to also help to protect when using the hand- pt lost it and we do not have another - he is looking for it. Was fitted with a mesh sleeve doubled to decrease edema and pian and mold tip. . Will continue to progress as tolerated. .     -Rehab Potential: Good  -Requires OT Follow Up: Yes  Time In:  1505            Time Out: 1400             Visit #: 4    Treatment Charges: Mins Units   Modalities: FL 10 1   Ther Exercise 20 1   Manual Therapy 10 1   Thera Activities 15 1   ADL/Home Mgt      Neuro Re-education     Gait Training     Group Therapy     Non-Billable Service Time     Other     Total Time/Units 55 4       -Response to Treatment: Pt feels he is getting better but is concerned about the recent increase in discomfort. The normal expected course of rehab was explained and support provided again on this date. Goals: Goals for pt can be seen on initial eval occurring on 1-19-22    Plan:   [x]  Continue Plan of care: Treatment covered based on POC and graduated to patient's progress. Pt education continues at each visit to obtain maximum benefits from skilled OT intervention.   []  Alter Plan of care:   []  Discharge:      Silvia Valencia OT/L,  CHT

## 2022-01-25 DIAGNOSIS — S68.011A AMPUTATION OF RIGHT THUMB WITH COMPLICATION, INITIAL ENCOUNTER: Primary | ICD-10-CM

## 2022-01-25 NOTE — TELEPHONE ENCOUNTER
Patient is requesting a medication refill, OARRS complete. Patient is 6 weeks out from surgery of Right thumb revision amputation. Patient was last seen on 12/27/21 and patients next appointment is 2/7/22. Patient was last given Norco 5-325mg q6hr PRN on 1/6/22.

## 2022-01-26 ENCOUNTER — TREATMENT (OUTPATIENT)
Dept: OCCUPATIONAL THERAPY | Age: 27
End: 2022-01-26
Payer: COMMERCIAL

## 2022-01-26 DIAGNOSIS — S68.511A COMPLETE TRAUMATIC AMPUTATION OF RIGHT THUMB THROUGH PHALANX, INITIAL ENCOUNTER: Primary | ICD-10-CM

## 2022-01-26 PROCEDURE — 97110 THERAPEUTIC EXERCISES: CPT | Performed by: OCCUPATIONAL THERAPIST

## 2022-01-26 PROCEDURE — 97140 MANUAL THERAPY 1/> REGIONS: CPT | Performed by: OCCUPATIONAL THERAPIST

## 2022-01-26 PROCEDURE — 97022 WHIRLPOOL THERAPY: CPT | Performed by: OCCUPATIONAL THERAPIST

## 2022-01-26 PROCEDURE — 97530 THERAPEUTIC ACTIVITIES: CPT | Performed by: OCCUPATIONAL THERAPIST

## 2022-01-26 RX ORDER — ACETAMINOPHEN AND CODEINE PHOSPHATE 300; 30 MG/1; MG/1
1 TABLET ORAL EVERY 6 HOURS PRN
Qty: 28 TABLET | Refills: 0 | Status: SHIPPED | OUTPATIENT
Start: 2022-01-26 | End: 2022-02-02

## 2022-01-26 NOTE — PROGRESS NOTES
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 Edema control, modalities prn  Frequency 3x week for 6 weeks      Assessment of current deficits   []? Functional mobility             [x]? ADLs          [x]? Strength                  []? Cognition   []? Functional transfers           [x]? IADLs         []? Safety Awareness  []? Endurance   [x]? Fine Motor Coordination    []? Balance      []? Vision/perception   [x]? Sensation     []? Gross Motor Coordination [x]? ROM           [x]? Pain                        [x]? Edema          [x]? Scar Adhesion/Skin Integrity      OT PLAN OF CARE   OT POC based on physician orders, patient diagnosis and results of clinical assessment     Frequency/Duration: 2-3x/ week x 6 weeks  Specific OT Treatment to include     [x]? Instruction in HEP                   Modalities:  [x]?  Therapeutic Exercise                 [x]? Ultrasound               []? Electrical Stimulation/Attended  [x]? PROM/Stretching                    [x]? Fluidotherapy          [x]?   Paraffin                   [x]? AAROM  [x]?  AROM                 []? Iontophoresis:   []? Tendon Brett Mancini  []? Neuromuscular Re-Ed            []? ADL/IADL re-training    [x]?  Therapeutic Activity                  [x]? Pain Management with/without modalities PRN                 [x]?  Manual Therapy                      []? Splinting                                   [x]? Scar Management                   []?Joint Protection/Training  []? Ergonomics                             []? Joint Mobilization                      []? Adaptive Equipment Assessment/Training                             [x]?  Manual Edema Mobilization   []? Myofascial Release                 []? Energy Conservation/Work Simplification  [x]? GM/FM Coordination                []? Safety retraining/education per  individual diagnosis/goals  [x]? Desensitization        Patient Specific Goal: \" To get better and get back to work. \"                             GOALS (Long term same as Short term):  1) Patient will demonstrate good understanding of home program (exercises/activities/diagnosis/prognosis/goals) with good accuracy. 2) Patient will demonstrate increased active/passive range of motion of their R thumb/ wrist to [facility] for ADL/IADL completion. 3) Patient will demonstrate  /pinch strength of at least 50# / 5 pinch pounds of their R hand. 4) Patient to report decreased pain in their affected r distal upper extremity from 4-6/10 with light movement  to 3/10 or less with resistive functional use. 5) Increase in fine motor function as evidenced by <25 sec to complete 9-hole peg test with the right hand. 6) Patient to report a decrease in hypersensitivity from 90% to 20% or less in their R thumb. 7) Patient to demonstrate decreased guarding of their affected extremity from 75% to 20% or less.    8) Patient will be knowledgeable of edema control techniques as evident with decreases from moderate to mild/none. 9) Patient will demonstrate a non-tender/non-adherent scar. 10) Patient will report ADL/ IADL functions as I using the right thumb/ hand. 11) Patient will decrease QuickDASH score to 30% or less for increased participation in daily functional activities.      Pain Level: 4 to 5- on scale of 1-10, sore, tingling/ numb, radiating, tight/pulling in the R thumb. Subjective: \" My thumb just seems to hurt and get so sensitive. It really bothers me sometime\". Objective:  Updated POC to be completed by 2-18-22. INTERVENTION: COMPLETED: SPECIFICS/COMMENTS:   Modality:     Fluidotherapy R hand with AROM- low heat x  10 min for desensitization and to decrease joint stiffness        AROM:     Wrist AROM x - AROM all planes   Thumb AROM X    X    x    x        X      x - thumb AROM ( extension, ABD, MP flexion)  - opposition ex  to IF, MF and RF only - over extend upon release. - string disc's, holding string with R - 16 pieces.   -working MP flexion - touch red 2.5\" cylinder peg/ therapist holds in his palm - able to gradually move peg-  to move further 15 rep's  -medium screw/ washer bolt assembly/ 2x on and off   -Exercise balls CLW   AAROM:               PROM/Stretching:     Thumb PROM X   ABD/ADD, ext, MP flexion   -opposition with pom poms (able to index/ middle and ring fingers)-         Scar Mass/Edema Control:     Retrograde massage X        x - R radial hand/ palm- beginning and end of session  -x lg gel finger sleeve - lost it - looking for   - fitted with mesh sleeve twisted at end and doubled. Wear as needed        Strengthening:               Other:     Desensitization  - beans and rice bins, tactile sensory ex/ challenging,  Fluiotherapy. Rice bin- removed items only - too painful           Assessment/Comments: Pt is making Fair + progress toward stated plan of care. ROM, edema control , FM completed today. Ess emphasis placed on desensitization. Pt is having an increase in nerve pains to the thumb. This is affected his tolerance for using the thumb and for getting proper sleep. Pt may benefit from a nerve medication. Will discuss with the MD at his next appt as needed. Will continue as tolerated. -Rehab Potential: Good  -Requires OT Follow Up: Yes  Time In:  1505            Time Out: 1400             Visit #: 5    Treatment Charges: Mins Units   Modalities: FL 10 1   Ther Exercise 20 1   Manual Therapy 10 1   Thera Activities 15 1   ADL/Home Mgt      Neuro Re-education     Gait Training     Group Therapy     Non-Billable Service Time     Other     Total Time/Units 55 4       -Response to Treatment: Pt feels he is getting better but is concerned about the recent increase in discomfort. The normal expected course of rehab was explained and support provided again on this date. Goals: Goals for pt can be seen on initial eval occurring on 1-19-22    Plan:   [x]  Continue Plan of care: Treatment covered based on POC and graduated to patient's progress. Pt education continues at each visit to obtain maximum benefits from skilled OT intervention.   []  [facility] of care:   []  Discharge:      Melody Ambrose OT/L, [id number]

## 2022-01-28 ENCOUNTER — TREATMENT (OUTPATIENT)
Dept: OCCUPATIONAL THERAPY | Age: 27
End: 2022-01-28
Payer: COMMERCIAL

## 2022-01-28 DIAGNOSIS — S68.511A COMPLETE TRAUMATIC AMPUTATION OF RIGHT THUMB THROUGH PHALANX, INITIAL ENCOUNTER: Primary | ICD-10-CM

## 2022-01-28 PROCEDURE — 97022 WHIRLPOOL THERAPY: CPT | Performed by: OCCUPATIONAL THERAPIST

## 2022-01-28 PROCEDURE — 97140 MANUAL THERAPY 1/> REGIONS: CPT | Performed by: OCCUPATIONAL THERAPIST

## 2022-01-28 PROCEDURE — 97530 THERAPEUTIC ACTIVITIES: CPT | Performed by: OCCUPATIONAL THERAPIST

## 2022-01-28 PROCEDURE — 97110 THERAPEUTIC EXERCISES: CPT | Performed by: OCCUPATIONAL THERAPIST

## 2022-01-28 NOTE — PROGRESS NOTES
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 Edema control, modalities prn  Frequency 3x week for 6 weeks      Assessment of current deficits   []? Functional mobility             [x]? ADLs          [x]? Strength                  []? Cognition   []? Functional transfers           [x]? IADLs         []? Safety Awareness  []? Endurance   [x]? Fine Motor Coordination    []? Balance      []? Vision/perception   [x]? Sensation     []? Gross Motor Coordination [x]? ROM           [x]? Pain                        [x]? Edema          [x]? Scar Adhesion/Skin Integrity      OT PLAN OF CARE   OT POC based on physician orders, patient diagnosis and results of clinical assessment     Frequency/Duration: 2-3x/ week x 6 weeks  Specific OT Treatment to include     [x]? Instruction in HEP                   Modalities:  [x]?  Therapeutic Exercise                 [x]? Ultrasound               []? Electrical Stimulation/Attended  [x]? PROM/Stretching                    [x]? Fluidotherapy          [x]?   Paraffin                   [x]? AAROM  [x]?  AROM                 []? Iontophoresis:   []? Tendon Anton Hermanns  []? Neuromuscular Re-Ed            []? ADL/IADL re-training    [x]?  Therapeutic Activity                  [x]? Pain Management with/without modalities PRN                 [x]?  Manual Therapy                      []? Splinting                                   [x]? Scar Management                   []?Joint Protection/Training  []? Ergonomics                             []? Joint Mobilization                      []? Adaptive Equipment Assessment/Training                             [x]?  Manual Edema Mobilization   []? Myofascial Release                 []? Energy Conservation/Work Simplification  [x]? GM/FM Coordination                []? Safety retraining/education per  individual diagnosis/goals  [x]? Desensitization        Patient Specific Goal: \" To get better and get back to work. \"                             GOALS (Long term same as Short term):  1) Patient will demonstrate good understanding of home program (exercises/activities/diagnosis/prognosis/goals) with good accuracy. 2) Patient will demonstrate increased active/passive range of motion of their R thumb/ wrist to [facility] for ADL/IADL completion. 3) Patient will demonstrate  /pinch strength of at least 50# / 5 pinch pounds of their R hand. 4) Patient to report decreased pain in their affected r distal upper extremity from 4-6/10 with light movement  to 3/10 or less with resistive functional use. 5) Increase in fine motor function as evidenced by <25 sec to complete 9-hole peg test with the right hand. 6) Patient to report a decrease in hypersensitivity from 90% to 20% or less in their R thumb. 7) Patient to demonstrate decreased guarding of their affected extremity from 75% to 20% or less.    8) Patient will be knowledgeable of edema control techniques as evident with decreases from moderate to mild/none. 9) Patient will demonstrate a non-tender/non-adherent scar. 10) Patient will report ADL/ IADL functions as I using the right thumb/ hand. 11) Patient will decrease QuickDASH score to 30% or less for increased participation in daily functional activities.      Pain Level: 4 to 5- on scale of 1-10, sore, tingling/ numb, radiating, tight/pulling in the R thumb. Subjective: \" I am feeling better today. It doesn't hurt as much\". Objective:  Updated POC to be completed by 2-18-22. INTERVENTION: COMPLETED: SPECIFICS/COMMENTS:   Modality:     Fluidotherapy R hand with AROM- low heat x  10 min for desensitization and to decrease joint stiffness        AROM:     Wrist AROM x - AROM all planes   Thumb AROM X    X    x                x   - thumb AROM ( extension, ABD, MP flexion)  - opposition ex  to IF, MF and RF only - over extend upon release. - string disc's, holding string with R - 16 pieces.   -working MP flexion - touch red 2.5\" cylinder peg/ therapist holds in his palm - able to gradually move peg-  to move further 15 rep's  -medium screw/ washer bolt assembly/ 2x on and off   -Exercise balls CLW   Coordination  ex     FM tasks X  X    x - flipping MRMT discs 5 pieces/ 4x  -PVC assembly/ ^VQ to use thumb- requires breaks due to hypersensitivity  -gentle opposition ex with Bunchems balls 10 pieces         PROM/Stretching:     Thumb PROM X   ABD/ADD, ext, MP flexion   -opposition with pom poms (able to index/ middle and ring fingers)-         Scar Mass/Edema Control:     Retrograde massage X        x - R radial hand/ palm- beginning and end of session  -x lg gel finger sleeve - lost it - looking for   - fitted with mesh sleeve twisted at end and doubled. Wear as needed        Strengthening:     Hand strengthening x - gentle gripping        Other:     Desensitization  - beans and rice bins, tactile sensory ex/ challenging,  Fluiotherapy.  Rice bin- removed items only - too painful    Handwriting x - red tubing provided to help to ease prehension needed for writing.   - discussed impiortance o9f writing tool size     Assessment/Comments: Pt is making Good+ progress toward stated plan of care. Overall pain levels is decreased today as the weather is warmer. ROM and FM tasks continued with a focus on functional integration of the thumb into activity. Pt was reminded to move slow and to be deliberate in movement. Progress continues. -Rehab Potential: Good  -Requires OT Follow Up: Yes  Time In:  1300            Time Out: 1400             Visit #: 6    Treatment Charges: Mins Units   Modalities: FL 10 1   Ther Exercise 20 1   Manual Therapy 10 1   Thera Activities 20 1   ADL/Home Mgt      Neuro Re-education     Gait Training     Group Therapy     Non-Billable Service Time     Other     Total Time/Units 60 4       -Response to Treatment: Pt is happy that his pain levels are decreasing. Goals: Goals for pt can be seen on initial eval occurring on 1-19-22    Plan:   [x]  Continue Plan of care: Continue ROM, edema control, desensitization and FM tasks. Treatment covered based on POC and graduated to patient's progress. Pt education continues at each visit to obtain maximum benefits from skilled OT intervention.   []  [address] of care:   []  Discharge:      Christine Almanza, OT/L, [id number]

## 2022-01-31 ENCOUNTER — TREATMENT (OUTPATIENT)
Dept: OCCUPATIONAL THERAPY | Age: 27
End: 2022-01-31
Payer: COMMERCIAL

## 2022-01-31 DIAGNOSIS — S68.511A COMPLETE TRAUMATIC AMPUTATION OF RIGHT THUMB THROUGH PHALANX, INITIAL ENCOUNTER: Primary | ICD-10-CM

## 2022-01-31 PROCEDURE — 97140 MANUAL THERAPY 1/> REGIONS: CPT | Performed by: OCCUPATIONAL THERAPIST

## 2022-01-31 PROCEDURE — 97110 THERAPEUTIC EXERCISES: CPT | Performed by: OCCUPATIONAL THERAPIST

## 2022-01-31 PROCEDURE — 97530 THERAPEUTIC ACTIVITIES: CPT | Performed by: OCCUPATIONAL THERAPIST

## 2022-01-31 PROCEDURE — 97022 WHIRLPOOL THERAPY: CPT | Performed by: OCCUPATIONAL THERAPIST

## 2022-01-31 NOTE — PROGRESS NOTES
[x]? Fluidotherapy          [x]?   Paraffin                   [x]? AAROM  [x]?  AROM                 []? Iontophoresis:   []? Tendon Tanna Khoury  []? Neuromuscular Re-Ed            []? ADL/IADL re-training    [x]?  Therapeutic Activity                  [x]? Pain Management with/without modalities PRN                 [x]?  Manual Therapy                      []? Splinting                                   [x]? Scar Management                   []?Joint Protection/Training  []? Ergonomics                             []? Joint Mobilization                      []? Adaptive Equipment Assessment/Training                             [x]?  Manual Edema Mobilization   []? Myofascial Release                 []? Energy Conservation/Work Simplification  [x]? GM/FM Coordination                []? Safety retraining/education per  individual diagnosis/goals  [x]? Desensitization        Patient Specific Goal: \" To get better and get back to work. \"                             GOALS (Long term same as Short term):  1) Patient will demonstrate good understanding of home program (exercises/activities/diagnosis/prognosis/goals) with good accuracy. 2) Patient will demonstrate increased active/passive range of motion of their R thumb/ wrist to [facility] for ADL/IADL completion. 3) Patient will demonstrate  /pinch strength of at least 50# / 5 pinch pounds of their R hand. 4) Patient to report decreased pain in their affected r distal upper extremity from 4-6/10 with light movement  to 3/10 or less with resistive functional use. 5) Increase in fine motor function as evidenced by <25 sec to complete 9-hole peg test with the right hand. 6) Patient to report a decrease in hypersensitivity from 90% to 20% or less in their R thumb. 7) Patient to demonstrate decreased guarding of their affected extremity from 75% to 20% or less.    8) Patient will be knowledgeable of edema control techniques as evident with decreases from moderate to mild/none. 9) Patient will demonstrate a non-tender/non-adherent scar. 10) Patient will report ADL/ IADL functions as I using the right thumb/ hand. 11) Patient will decrease QuickDASH score to 30% or less for increased participation in daily functional activities.      Pain Level: 4 to 5- on scale of 1-10, sore, tingling/ numb, radiating, tight/pulling in the R thumb. Radial nerve pain more the ulnarly. Subjective: \" I use the beans at home to desensitize but my thumb but it's still real irritated. \"     Objective:  Updated POC to be completed by 2-18-22. INTERVENTION: COMPLETED: SPECIFICS/COMMENTS:   Modality:     Fluidotherapy R hand with AROM- low heat x  10 min for desensitization and to decrease joint stiffness        AROM:     Wrist AROM x - AROM all planes   Thumb AROM X  x                   - thumb AROM ( extension, ABD, MP flexion)  - opposition ex- using sm beads to IF, MF and RF only - over extend upon release. - string disc's, holding string with R - 16 pieces.   -working MP flexion - touch red 2.5\" cylinder peg/ therapist holds in his palm - able to gradually move peg-  to move further 15 rep's  -medium screw/ washer bolt assembly/ 2x on and off   -Exercise balls CLW   Coordination  ex     FM tasks X            x   - flipping MRMT discs ^ to 10  pieces/ 4x- sets between other tasks. -PVC assembly/ ^VQ to use thumb- requires breaks due to hypersensitivity  -gentle opposition ex with Bunchems balls 10 pieces   - lg screws - take apart 5 pieces        PROM/Stretching:     Thumb PROM X   ABD/ADD, ext, MP flexion           Scar Mass/Edema Control:     Retrograde massage X        x - R radial hand/ palm- beginning and end of session  -x lg gel finger sleeve - lost it - looking for   - fitted with mesh sleeve twisted at end and doubled.  Wear as needed        Strengthening:     Hand strengthening   x - gentle gripping  - velcro board - small lateral pinch - one pass down only 2* to ^'ed pain. Other:     Desensitization  - beans and rice bins, tactile sensory ex/ challenging,  Fluiotherapy. Rice bin- removed items only - too painful    Handwriting/ functional tasks       x - red tubing provided to help to ease prehension needed for writing.   - discussed impiortance of writing tool size  - folding towels encouraging pnching with R  Hand - 5 towels. Assessment/Comments: Pt is making Good+ progress toward stated plan of care. Pt's hypersensitivity continues in his thumb - more radially then ulnarly. . ROM and FM tasks continued with a focus on functional integration of the thumb into activity. Pt was reminded to move slow and to be deliberate in movement. Progress continues. Will progress as tolerated. -Rehab Potential: Good  -Requires OT Follow Up: Yes  Time In:  1300            Time Out: 1400             Visit #: 7    Treatment Charges: Mins Units   Modalities: FL 10 1   Ther Exercise 20 1   Manual Therapy 10 1   Thera Activities 20 1   ADL/Home Mgt      Neuro Re-education     Gait Training     Group Therapy     Non-Billable Service Time     Other     Total Time/Units 60 4       -Response to Treatment: Pt understands the sensitivity will take time to decrease  but gets frustrated  at times. Goals: Goals for pt can be seen on initial eval occurring on 1-19-22    Plan:   [x]  Continue Plan of care: Continue ROM, edema control, desensitization and FM tasks. Treatment covered based on POC and graduated to patient's progress. Pt education continues at each visit to obtain maximum benefits from skilled OT intervention.   []  [facility] Plan of care:   []  Discharge:      Dinorah Benito, OT/L, CHT

## 2022-02-02 ENCOUNTER — TREATMENT (OUTPATIENT)
Dept: OCCUPATIONAL THERAPY | Age: 27
End: 2022-02-02
Payer: COMMERCIAL

## 2022-02-02 DIAGNOSIS — S68.511A COMPLETE TRAUMATIC AMPUTATION OF RIGHT THUMB THROUGH PHALANX, INITIAL ENCOUNTER: Primary | ICD-10-CM

## 2022-02-02 PROCEDURE — 97110 THERAPEUTIC EXERCISES: CPT | Performed by: OCCUPATIONAL THERAPIST

## 2022-02-02 PROCEDURE — 97140 MANUAL THERAPY 1/> REGIONS: CPT | Performed by: OCCUPATIONAL THERAPIST

## 2022-02-02 PROCEDURE — 97022 WHIRLPOOL THERAPY: CPT | Performed by: OCCUPATIONAL THERAPIST

## 2022-02-02 PROCEDURE — 97530 THERAPEUTIC ACTIVITIES: CPT | Performed by: OCCUPATIONAL THERAPIST

## 2022-02-02 NOTE — PROGRESS NOTES
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 Edema control, modalities prn  Frequency 3x week for 6 weeks      Assessment of current deficits   []? Functional mobility             [x]? ADLs          [x]? Strength                  []? Cognition   []? Functional transfers           [x]? IADLs         []? Safety Awareness  []? Endurance   [x]? Fine Motor Coordination    []? Balance      []? Vision/perception   [x]? Sensation     []? Gross Motor Coordination [x]? ROM           [x]? Pain                        [x]? Edema          [x]? Scar Adhesion/Skin Integrity      OT PLAN OF CARE   OT POC based on physician orders, patient diagnosis and results of clinical assessment     Frequency/Duration: 2-3x/ week x 6 weeks  Specific OT Treatment to include     [x]? Instruction in HEP                   Modalities:  [x]?  Therapeutic Exercise                 [x]? Ultrasound               []? Electrical Stimulation/Attended  [x]? PROM/Stretching                    [x]? Fluidotherapy          [x]?   Paraffin                   [x]? AAROM  [x]?  AROM                 []? Iontophoresis:   []? Tendon Jill Sammy  []? Neuromuscular Re-Ed            []? ADL/IADL re-training    [x]?  Therapeutic Activity                  [x]? Pain Management with/without modalities PRN                 [x]?  Manual Therapy                      []? Splinting                                   [x]? Scar Management                   []?Joint Protection/Training  []? Ergonomics                             []? Joint Mobilization                      []? Adaptive Equipment Assessment/Training                             [x]?  Manual Edema Mobilization   []? Myofascial Release                 []? Energy Conservation/Work Simplification  [x]? GM/FM Coordination                []? Safety retraining/education per  individual diagnosis/goals  [x]? Desensitization        Patient Specific Goal: \" To get better and get back to work. \"                             GOALS (Long term same as Short term):  1) Patient will demonstrate good understanding of home program (exercises/activities/diagnosis/prognosis/goals) with good accuracy. 2) Patient will demonstrate increased active/passive range of motion of their R thumb/ wrist to [facility] for ADL/IADL completion. 3) Patient will demonstrate  /pinch strength of at least 50# / 5 pinch pounds of their R hand. 4) Patient to report decreased pain in their affected r distal upper extremity from 4-6/10 with light movement  to 3/10 or less with resistive functional use. 5) Increase in fine motor function as evidenced by <25 sec to complete 9-hole peg test with the right hand. 6) Patient to report a decrease in hypersensitivity from 90% to 20% or less in their R thumb. 7) Patient to demonstrate decreased guarding of their affected extremity from 75% to 20% or less.    8) Patient will be knowledgeable of edema control techniques as evident with decreases from moderate to mild/none. 9) Patient will demonstrate a non-tender/non-adherent scar. 10) Patient will report ADL/ IADL functions as I using the right thumb/ hand. 11) Patient will decrease QuickDASH score to 30% or less for increased participation in daily functional activities.      Pain Level: 4 to 5- on scale of 1-10, sore, tingling/ numb, radiating, tight/pulling in the R thumb. Radial nerve pain more the ulnarly. Subjective: Pt presents with no new complaints. Objective:  Updated POC to be completed by 2-18-22. INTERVENTION: COMPLETED: SPECIFICS/COMMENTS:   Modality:     Fluidotherapy R hand with AROM- low heat x  10 min for desensitization and to decrease joint stiffness        AROM:     Wrist AROM x - AROM all planes   Thumb AROM X  x                   - thumb AROM ( extension, ABD, MP flexion)  - opposition ex- using sm beads to IF, MF and RF only - over extend upon release. - string disc's, holding string with R - 16 pieces.   -working MP flexion - touch red 2.5\" cylinder peg/ therapist holds in his palm - able to gradually move peg-  to move further 15 rep's  -medium screw/ washer bolt assembly/ 2x on and off   -Exercise balls CLW   Coordination  ex     FM tasks               x - flipping MRMT discs ^ to 10  pieces/ 4x- sets between other tasks. -PVC assembly/ ^VQ to use thumb- requires breaks due to hypersensitivity  -gentle opposition ex with Bunchems balls 10 pieces   - lg screws - take apart 5 pieces  - [employer] pegboard assembly- 4/ challenging        PROM/Stretching:     Thumb PROM X   ABD/ADD, ext, MP flexion      Palmar WB with thumb in radial ext x Gentle stretches   Scar Mass/Edema Control:     Retrograde massage X        x - R radial hand/ palm- beginning and end of session  -x lg gel finger sleeve - lost it - looking for   - fitted with mesh sleeve twisted at end and doubled.  Wear as needed        Strengthening:     Hand strengthening    - gentle gripping/ xsoft putty  - velcro board - small lateral pinch - one pass down only 2* to ^'ed pain. Other:     Desensitization  - beans and rice bins, tactile sensory ex/ challenging,  Fluiotherapy. Rice bin- removed items only - too painful    Handwriting/ functional tasks       x - red tubing provided to help to ease prehension needed for writing.   - discussed impiortance of writing tool size  - folding towels encouraging pinching with R  Hand - 5 towels. Assessment/Comments: Pt is making Good+ progress toward stated plan of care. Pt's hypersensitivity continues in his thumb - especially at 1 localized spot in the volar tip. Christie Mathis ROM and FM tasks continued with a focus on functional integration of the thumb into activity. Pt was reminded to move slow and to be deliberate in movement. Pt may benefit from medication for the nerve sensitivity and is encouraged to discuss to see if is is appropriate with his physician. Progress continues. Will progress as tolerated. -Rehab Potential: Good  -Requires OT Follow Up: Yes  Time In:  1500            Time Out: 1600             Visit #: 8    Treatment Charges: Mins Units   Modalities: FL 10 1   Ther Exercise 20 1   Manual Therapy 10 1   Thera Activities 20 1   ADL/Home Mgt      Neuro Re-education     Gait Training     Group Therapy     Non-Billable Service Time     Other     Total Time/Units 60 4       -Response to Treatment: Pt understands the sensitivity will take time to decrease  but gets frustrated  at times. Goals: Goals for pt can be seen on initial eval occurring on 1-19-22    Plan:   [x]  Continue Plan of care: Continue ROM, edema control, desensitization and FM tasks. Advance resistive strengthening as tolerated. Treatment covered based on POC and graduated to patient's progress. Pt education continues at each visit to obtain maximum benefits from skilled OT intervention.   []  [facility] of care:   []  Discharge:      Sarita Cranker, OT/L, [id number]

## 2022-02-07 ENCOUNTER — OFFICE VISIT (OUTPATIENT)
Dept: ORTHOPEDIC SURGERY | Age: 27
End: 2022-02-07

## 2022-02-07 ENCOUNTER — TREATMENT (OUTPATIENT)
Dept: OCCUPATIONAL THERAPY | Age: 27
End: 2022-02-07
Payer: COMMERCIAL

## 2022-02-07 VITALS — WEIGHT: 240 LBS | HEIGHT: 66 IN | BODY MASS INDEX: 38.57 KG/M2

## 2022-02-07 DIAGNOSIS — S68.511A COMPLETE TRAUMATIC AMPUTATION OF RIGHT THUMB THROUGH PHALANX, INITIAL ENCOUNTER: Primary | ICD-10-CM

## 2022-02-07 DIAGNOSIS — S68.011A AMPUTATION OF RIGHT THUMB WITH COMPLICATION, INITIAL ENCOUNTER: Primary | ICD-10-CM

## 2022-02-07 PROCEDURE — 97022 WHIRLPOOL THERAPY: CPT | Performed by: OCCUPATIONAL THERAPIST

## 2022-02-07 PROCEDURE — 97140 MANUAL THERAPY 1/> REGIONS: CPT | Performed by: OCCUPATIONAL THERAPIST

## 2022-02-07 PROCEDURE — 99024 POSTOP FOLLOW-UP VISIT: CPT | Performed by: ORTHOPAEDIC SURGERY

## 2022-02-07 PROCEDURE — 97530 THERAPEUTIC ACTIVITIES: CPT | Performed by: OCCUPATIONAL THERAPIST

## 2022-02-07 PROCEDURE — 97110 THERAPEUTIC EXERCISES: CPT | Performed by: OCCUPATIONAL THERAPIST

## 2022-02-07 NOTE — PROGRESS NOTES
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 Edema control, modalities prn  Frequency 3x week for 6 weeks      Assessment of current deficits   []? Functional mobility             [x]? ADLs          [x]? Strength                  []? Cognition   []? Functional transfers           [x]? IADLs         []? Safety Awareness  []? Endurance   [x]? Fine Motor Coordination    []? Balance      []? Vision/perception   [x]? Sensation     []? Gross Motor Coordination [x]? ROM           [x]? Pain                        [x]? Edema          [x]? Scar Adhesion/Skin Integrity      OT PLAN OF CARE   OT POC based on physician orders, patient diagnosis and results of clinical assessment     Frequency/Duration: 2-3x/ week x 6 weeks  Specific OT Treatment to include     [x]? Instruction in HEP                   Modalities:  [x]?  Therapeutic Exercise                 [x]? Ultrasound               []? Electrical Stimulation/Attended  [x]? PROM/Stretching                    [x]? Fluidotherapy          [x]?   Paraffin                   [x]? AAROM  [x]?  AROM                 []? Iontophoresis:   []? Tendon Murlene Bare  []? Neuromuscular Re-Ed            []? ADL/IADL re-training    [x]?  Therapeutic Activity                  [x]? Pain Management with/without modalities PRN                 [x]?  Manual Therapy                      []? Splinting                                   [x]? Scar Management                   []?Joint Protection/Training  []? Ergonomics                             []? Joint Mobilization                      []? Adaptive Equipment Assessment/Training                             [x]?  Manual Edema Mobilization   []? Myofascial Release                 []? Energy Conservation/Work Simplification  [x]? GM/FM Coordination                []? Safety retraining/education per  individual diagnosis/goals  [x]? Desensitization        Patient Specific Goal: \" To get better and get back to work. \"                             GOALS (Long term same as Short term):  1) Patient will demonstrate good understanding of home program (exercises/activities/diagnosis/prognosis/goals) with good accuracy. 2) Patient will demonstrate increased active/passive range of motion of their R thumb/ wrist to [facility] for ADL/IADL completion. 3) Patient will demonstrate  /pinch strength of at least 50# / 5 pinch pounds of their R hand. 4) Patient to report decreased pain in their affected r distal upper extremity from 4-6/10 with light movement  to 3/10 or less with resistive functional use. 5) Increase in fine motor function as evidenced by <25 sec to complete 9-hole peg test with the right hand. 6) Patient to report a decrease in hypersensitivity from 90% to 20% or less in their R thumb. 7) Patient to demonstrate decreased guarding of their affected extremity from 75% to 20% or less.    8) Patient will be knowledgeable of edema control techniques as evident with decreases from moderate to mild/none. 9) Patient will demonstrate a non-tender/non-adherent scar. 10) Patient will report ADL/ IADL functions as I using the right thumb/ hand. 11) Patient will decrease QuickDASH score to 30% or less for increased participation in daily functional activities.      Pain Level: 4 to 5- on scale of 1-10, sore, tingling/ numb, radiating, tight/pulling in the R thumb. Radial nerve pain more the ulnarly. Subjective: Pt states \" The Dr said I could go back to work light duty - so i'll have to let you know what my schedule is after I talk to my boss because I want to still come to therapy. \"    Objective:  Updated POC to be completed by 2-18-22. INTERVENTION: COMPLETED: SPECIFICS/COMMENTS:   Modality:     Fluidotherapy R hand with AROM- low heat x  10 min for desensitization and to decrease joint stiffness        AROM:     Wrist AROM x - AROM all planes   Thumb AROM X  x                   - thumb AROM ( extension, ABD, MP flexion)  - opposition ex- using sm beads to IF, MF and RF only - over extend upon release. Dug into bag for discs this date for desensitization also. .   - string disc's, holding string with R - 16 pieces.   -working MP flexion - touch red 2.5\" cylinder peg/ therapist holds in his palm - able to gradually move peg-  to move further 15 rep's  -medium screw/ washer bolt assembly/ 2x on and off   -Exercise balls CLW   Coordination  ex     FM tasks                 x - flipping MRMT discs ^ to 10  pieces/ 4x- sets between other tasks. -PVC assembly/ ^VQ to use thumb- requires breaks due to hypersensitivity  -gentle opposition ex with Bunchems balls 10 pieces   - lg screws - take apart 5 pieces  - [employer] pegboard assembly- 4/ challenging  -tweezers -  buttons - 10 lateral pinch but not on his sore spot and kristopher 3 for 3 pt pinch.             PROM/Stretching:     Thumb PROM X   ABD/ADD, ext, MP flexion      Palmar WB with thumb in radial ext x Gentle stretches   Scar Mass/Edema Control:     Retrograde massage X      x   - R radial hand/ palm- beginning and end of session  -x lg gel finger sleeve - lost it - looking for   - mesh sleeve twisted at end and doubled. Wear as needed        Strengthening:     Hand strengthening       x - gentle gripping/ xsoft putty  - velcro board - small lateral pinch - one pass down only 2* to ^'ed pain. - lateral pinch - remove beads form easy putty x's 5 pieces. Other:     Desensitization x - beans and rice bins, tactile sensory ex/ challenging,  Fluiotherapy. Rice bin- removed items only - too painful    Handwriting/ functional tasks        - red tubing provided to help to ease prehension needed for writing.   - discussed impiortance of writing tool size  - folding towels encouraging pinching with R  Hand - 5 towels. Assessment/Comments: Pt is making Good+ progress toward stated plan of care. Pt's hypersensitivity continues in his thumb - especially at 1 localized spot in the volar tip. Rosaline Maid ROM and FM tasks continued with a focus on functional integration of the thumb into activity. Pt was reminded to move slow and to be deliberate in movement and to work on desensitization throughout the day. .     Pt may benefit from medication for the nerve sensitivity and is encouraged to discuss to see if is is appropriate with his physician. Progress continues. Will progress as tolerated. -Rehab Potential: Good  -Requires OT Follow Up: Yes  Time In:  1500            Time Out: 1600             Visit #: 9    Treatment Charges: Mins Units   Modalities: FL 10 1   Ther Exercise 20 1   Manual Therapy 10 1   Thera Activities 20 1   ADL/Home Mgt      Neuro Re-education     Gait Training     Group Therapy     Non-Billable Service Time     Other     Total Time/Units 60 4       -Response to Treatment: Pt understands the sensitivity will take time to decrease  but gets frustrated  at times.    Goals: Goals for pt can be seen on initial eval occurring on 1-19-22    Plan:   [x]  Continue Plan of care: Continue ROM, edema control, desensitization and FM tasks. Advance resistive strengthening as tolerated. Treatment covered based on POC and graduated to patient's progress. Pt education continues at each visit to obtain maximum benefits from skilled OT intervention.   []  Alter Plan of care:   []  Discharge:      Roseann Jo, OT/L, CHT

## 2022-02-07 NOTE — PROGRESS NOTES
Approximate 7 weeks out status post amputation of right thumb. Overall he is doing well. He feels that things are improving in therapy. His swelling has improved. Still has significant sensitivity particular of the radial digital nerve of the thumb. His range of motion is limited but improving at the MCP joint. Physical exam: Amputation site well-healed. Mild swelling. Negative sensitivity over the ulnar digital nerve of the thumb. Hypersensitivity over the radial digital nerve at the amputation site consistent with possible neuroma. Intact thumb MCP joint range of motion but limited. Assessment: 7 weeks out status post revision amputation of thumb. Remains with swelling and stiffness and sensitivity particularly over the radial digital nerve at the amputation site. Plan    Continue with therapy. Focus on desensitization. Improving range of motion. We also referred him over to [facility] for evaluation for possible thumb prosthetic to improve opposition and pinch. He like to return to work light duty only. No repetitive pinching gripping or use of his right hand. Okay for light duty.

## 2022-02-09 ENCOUNTER — TREATMENT (OUTPATIENT)
Dept: OCCUPATIONAL THERAPY | Age: 27
End: 2022-02-09
Payer: COMMERCIAL

## 2022-02-09 DIAGNOSIS — S68.511A COMPLETE TRAUMATIC AMPUTATION OF RIGHT THUMB THROUGH PHALANX, INITIAL ENCOUNTER: Primary | ICD-10-CM

## 2022-02-09 PROCEDURE — 97022 WHIRLPOOL THERAPY: CPT | Performed by: OCCUPATIONAL THERAPIST

## 2022-02-09 PROCEDURE — 97110 THERAPEUTIC EXERCISES: CPT | Performed by: OCCUPATIONAL THERAPIST

## 2022-02-09 PROCEDURE — 97530 THERAPEUTIC ACTIVITIES: CPT | Performed by: OCCUPATIONAL THERAPIST

## 2022-02-09 PROCEDURE — 97140 MANUAL THERAPY 1/> REGIONS: CPT | Performed by: OCCUPATIONAL THERAPIST

## 2022-02-09 NOTE — PROGRESS NOTES
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 Edema control, modalities prn  Frequency 3x week for 6 weeks      Assessment of current deficits   []? Functional mobility             [x]? ADLs          [x]? Strength                  []? Cognition   []? Functional transfers           [x]? IADLs         []? Safety Awareness  []? Endurance   [x]? Fine Motor Coordination    []? Balance      []? Vision/perception   [x]? Sensation     []? Gross Motor Coordination [x]? ROM           [x]? Pain                        [x]? Edema          [x]? Scar Adhesion/Skin Integrity      OT PLAN OF CARE   OT POC based on physician orders, patient diagnosis and results of clinical assessment     Frequency/Duration: 2-3x/ week x 6 weeks  Specific OT Treatment to include     [x]? Instruction in HEP                   Modalities:  [x]?  Therapeutic Exercise                 [x]? Ultrasound               []? Electrical Stimulation/Attended  [x]? PROM/Stretching                    [x]? Fluidotherapy          [x]?   Paraffin                   [x]? AAROM  [x]?  AROM                 []? Iontophoresis:   []? Tendon Collette Osborn  []? Neuromuscular Re-Ed            []? ADL/IADL re-training    [x]?  Therapeutic Activity                  [x]? Pain Management with/without modalities PRN                 [x]?  Manual Therapy                      []? Splinting                                   [x]? Scar Management                   []?Joint Protection/Training  []? Ergonomics                             []? Joint Mobilization                      []? Adaptive Equipment Assessment/Training                             [x]?  Manual Edema Mobilization   []? Myofascial Release                 []? Energy Conservation/Work Simplification  [x]? GM/FM Coordination                []? Safety retraining/education per  individual diagnosis/goals  [x]? Desensitization        Patient Specific Goal: \" To get better and get back to work. \"                             GOALS (Long term same as Short term):  1) Patient will demonstrate good understanding of home program (exercises/activities/diagnosis/prognosis/goals) with good accuracy. 2) Patient will demonstrate increased active/passive range of motion of their R thumb/ wrist to [facility] for ADL/IADL completion. 3) Patient will demonstrate  /pinch strength of at least 50# / 5 pinch pounds of their R hand. 4) Patient to report decreased pain in their affected r distal upper extremity from 4-6/10 with light movement  to 3/10 or less with resistive functional use. 5) Increase in fine motor function as evidenced by <25 sec to complete 9-hole peg test with the right hand. 6) Patient to report a decrease in hypersensitivity from 90% to 20% or less in their R thumb. 7) Patient to demonstrate decreased guarding of their affected extremity from 75% to 20% or less.    8) Patient will be knowledgeable of edema control techniques as evident with decreases from moderate to mild/none. 9) Patient will demonstrate a non-tender/non-adherent scar. 10) Patient will report ADL/ IADL functions as I using the right thumb/ hand. 11) Patient will decrease QuickDASH score to 30% or less for increased participation in daily functional activities.      Pain Level: 4 to 5- on scale of 1-10, sore, tingling/ numb, radiating, tight/pulling in the R thumb. Radial nerve pain more the ulnarly. Subjective: Pt states \" The Dr said I could go back to work light duty - so i'll have to let you know what my schedule is after I talk to my boss because I want to still come to therapy. \"    Objective:  Updated POC to be completed by 2-18-22. INTERVENTION: COMPLETED: SPECIFICS/COMMENTS:   Modality:     Fluidotherapy R hand with AROM- low heat x  10 min for desensitization and to decrease joint stiffness        AROM:     Wrist AROM x - AROM all planes   Thumb AROM X  X    x    x         - thumb AROM ( extension, ABD, MP flexion)  - opposition ex- using xsm beads to IF, MF and RF only - over extend upon release.   - Dig into bag for discs this date for desensitization also. - string disc's, holding string with R - 16 pieces. -medium screw/ washer bolt assembly/ 2x on and off   -Exercise balls CLW   Coordination  ex     FM tasks         x     -gentle opposition ex with Bunchems balls 10 pieces   - lg screws - take apart 5 pieces  - Purdue pegboard assembly- 4/ challenging  -tweezers -  buttons - 10 lateral pinch but not on his sore spot and kristopher 3 for 3 pt pinch. PROM/Stretching:     Thumb PROM X   ABD/ADD, ext, MP flexion      Palmar WB with thumb in radial ext x Gentle stretches   Scar Mass/Edema Control:     Retrograde massage X         - R radial hand/ palm- beginning and end of session  -x lg gel finger sleeve - lost it - looking for   - mesh sleeve twisted at end and doubled.  Wear as needed        Strengthening:     Hand strengthening       x - gentle gripping/ xsoft putty  - velcro board - small lateral pinch - one pass down only 2* to ^'ed pain. - lateral pinch - remove beads from xsoft putty x's 5 pieces. Other:     Desensitization x - beans and rice bins, tactile sensory ex/ challenging,  Fluidotherapy. Rice bin- removed items only - too painful    Handwriting/ functional tasks        - red tubing provided to help to ease prehension needed for writing.   - discussed impiortance of writing tool size  - folding towels encouraging pinching with R  Hand - 5 towels. Assessment/Comments: Pt is making Good+ progress toward stated plan of care. Desensitization, hand strengthening and FM tasks continued with improving tolerance. Swelling in the radial hand is much better. Pt reports he is using his thumb more during the day. Good progress is noted. -Rehab Potential: Good  -Requires OT Follow Up: Yes  Time In:  1500            Time Out: 1600             Visit #: 10    Treatment Charges: Mins Units   Modalities: FL 10 1   Ther Exercise 20 1   Manual Therapy 10 1   Thera Activities 20 1   ADL/Home Mgt      Neuro Re-education     Gait Training     Group Therapy     Non-Billable Service Time     Other     Total Time/Units 60 4       -Response to Treatment: Pt is happy with his progress and feels he is starting to use his thumb more. Goals: Goals for pt can be seen on initial eval occurring on 1-19-22    Plan:   [x]  Continue Plan of care: Continue ROM, edema control, desensitization and FM tasks. Advance resistive strengthening as tolerated. Treatment covered based on POC and graduated to patient's progress. Pt education continues at each visit to obtain maximum benefits from skilled OT intervention.   []  [facility] of care:   []  Discharge:      Senait Ceballos, OT/L, [id number]

## 2022-02-11 ENCOUNTER — TREATMENT (OUTPATIENT)
Dept: OCCUPATIONAL THERAPY | Age: 27
End: 2022-02-11
Payer: COMMERCIAL

## 2022-02-11 DIAGNOSIS — S68.511A COMPLETE TRAUMATIC AMPUTATION OF RIGHT THUMB THROUGH PHALANX, INITIAL ENCOUNTER: Primary | ICD-10-CM

## 2022-02-11 PROCEDURE — 97140 MANUAL THERAPY 1/> REGIONS: CPT | Performed by: OCCUPATIONAL THERAPY ASSISTANT

## 2022-02-11 PROCEDURE — 97530 THERAPEUTIC ACTIVITIES: CPT | Performed by: OCCUPATIONAL THERAPY ASSISTANT

## 2022-02-11 PROCEDURE — 97022 WHIRLPOOL THERAPY: CPT | Performed by: OCCUPATIONAL THERAPY ASSISTANT

## 2022-02-11 PROCEDURE — 97110 THERAPEUTIC EXERCISES: CPT | Performed by: OCCUPATIONAL THERAPY ASSISTANT

## 2022-02-11 NOTE — PROGRESS NOTES
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 Edema control, modalities prn  Frequency 3x week for 6 weeks      Assessment of current deficits   []? Functional mobility             [x]? ADLs          [x]? Strength                  []? Cognition   []? Functional transfers           [x]? IADLs         []? Safety Awareness  []? Endurance   [x]? Fine Motor Coordination    []? Balance      []? Vision/perception   [x]? Sensation     []? Gross Motor Coordination [x]? ROM           [x]? Pain                        [x]? Edema          [x]? Scar Adhesion/Skin Integrity      OT PLAN OF CARE   OT POC based on physician orders, patient diagnosis and results of clinical assessment     Frequency/Duration: 2-3x/ week x 6 weeks  Specific OT Treatment to include     [x]? Instruction in HEP                   Modalities:  [x]?  Therapeutic Exercise                 [x]? Ultrasound               []? Electrical Stimulation/Attended  [x]? PROM/Stretching                    [x]? Fluidotherapy          [x]?   Paraffin                   [x]? AAROM  [x]?  AROM                 []? Iontophoresis:   []? Tendon Marshel Blower  []? Neuromuscular Re-Ed            []? ADL/IADL re-training    [x]?  Therapeutic Activity                  [x]? Pain Management with/without modalities PRN                 [x]?  Manual Therapy                      []? Splinting                                   [x]? Scar Management                   []?Joint Protection/Training  []? Ergonomics                             []? Joint Mobilization                      []? Adaptive Equipment Assessment/Training                             [x]?  Manual Edema Mobilization   []? Myofascial Release                 []? Energy Conservation/Work Simplification  [x]? GM/FM Coordination                []? Safety retraining/education per  individual diagnosis/goals  [x]? Desensitization        Patient Specific Goal: \" To get better and get back to work. \"                             GOALS (Long term same as Short term):  1) Patient will demonstrate good understanding of home program (exercises/activities/diagnosis/prognosis/goals) with good accuracy. 2) Patient will demonstrate increased active/passive range of motion of their R thumb/ wrist to [facility] for ADL/IADL completion. 3) Patient will demonstrate  /pinch strength of at least 50# / 5 pinch pounds of their R hand. 4) Patient to report decreased pain in their affected r distal upper extremity from 4-6/10 with light movement  to 3/10 or less with resistive functional use. 5) Increase in fine motor function as evidenced by <25 sec to complete 9-hole peg test with the right hand. 6) Patient to report a decrease in hypersensitivity from 90% to 20% or less in their R thumb. 7) Patient to demonstrate decreased guarding of their affected extremity from 75% to 20% or less.    8) Patient will be knowledgeable of edema control techniques as evident with decreases from moderate to mild/none. 9) Patient will demonstrate a non-tender/non-adherent scar. 10) Patient will report ADL/ IADL functions as I using the right thumb/ hand. 11) Patient will decrease QuickDASH score to 30% or less for increased participation in daily functional activities.      Pain Level: 2-3 on scale of 1-10, sore, tingling/ numb, radiating, tight/pulling in the R thumb. Radial nerve pain more the ulnarly. Subjective: Pt states \"My hand feels good today, maybe because it's warmer outside\"     Objective:  Updated POC to be completed by 2-18-22. INTERVENTION: COMPLETED: SPECIFICS/COMMENTS:   Modality:     Fluidotherapy R hand with AROM- low heat x  10 min for desensitization and to decrease joint stiffness        AROM:     Wrist AROM x - AROM all planes   Thumb AROM X  X    x    x    x    x - thumb AROM ( extension, ABD, MP flexion)  - opposition ex- using xsm beads to IF, MF and RF only - over extend upon release.   - Dig into bag for discs this date for desensitization also. - string disc's, holding string with R - 16 pieces. -medium screw/ washer bolt assembly/ 2x on and off   -towel task with thumb- 5 reps   -Exercise balls CLW   Coordination  ex     FM tasks              -gentle opposition ex with Bunchems balls 10 pieces   - lg screws - take apart 5 pieces  - [employer] pegboard assembly- 4/ challenging  -tweezers -  buttons - 10 lateral pinch but not on his sore spot and kristopher 3 for 3 pt pinch. PROM/Stretching:     Thumb PROM X   ABD/ADD, ext, MP flexion      Palmar WB with thumb in radial ext x Gentle stretches   Scar Mass/Edema Control:     Retrograde massage X         - R radial hand/ palm- beginning and end of session  -x lg gel finger sleeve - lost it - looking for   - mesh sleeve twisted at end and doubled.  Wear as needed        Strengthening:     Hand strengthening       x - gentle gripping/ xsoft putty  - velcro board - small lateral pinch - one pass down only 2* to ^'ed pain. - lateral pinch - remove beads from xsoft putty x's 5 pieces. Other:     Desensitization x - beans and rice bins, tactile sensory ex/ challenging,  Fluidotherapy. Rice bin- removed items only - too painful    Handwriting/ functional tasks        - red tubing provided to help to ease prehension needed for writing.   - discussed impiortance of writing tool size  - folding towels encouraging pinching with R  Hand - 5 towels. Assessment/Comments: Pt is making Good+ progress toward stated plan of care. Pt. Tolerated all exercises and stretches today. Focus on [address] and prehension today. Will continue to advance as tolerated. -Rehab Potential: Good  -Requires OT Follow Up: Yes  Time In:  1300           Time Out: 1400            Visit #: 11    Treatment Charges: Mins Units   Modalities: FL 10 1   Ther Exercise 20 1   Manual Therapy 10 1   Thera Activities 20 1   ADL/Home Mgt      Neuro Re-education     Gait Training     Group Therapy     Non-Billable Service Time     Other     Total Time/Units 60 4       -Response to Treatment: Pt is happy with his progress and feels he is starting to use his thumb more. Goals: Goals for pt can be seen on initial eval occurring on 1-19-22    Plan:   [x]  Continue Plan of care: Continue ROM, edema control, desensitization and FM tasks. Advance resistive strengthening as tolerated. Treatment covered based on POC and graduated to patient's progress. Pt education continues at each visit to obtain maximum benefits from skilled OT intervention.   []  [address] of care:   []  Discharge:      Renita Allen, [facility] 82, [id number]

## 2022-02-16 ENCOUNTER — TREATMENT (OUTPATIENT)
Dept: OCCUPATIONAL THERAPY | Age: 27
End: 2022-02-16
Payer: COMMERCIAL

## 2022-02-16 DIAGNOSIS — S68.511A COMPLETE TRAUMATIC AMPUTATION OF RIGHT THUMB THROUGH PHALANX, INITIAL ENCOUNTER: Primary | ICD-10-CM

## 2022-02-16 PROCEDURE — 97110 THERAPEUTIC EXERCISES: CPT | Performed by: OCCUPATIONAL THERAPIST

## 2022-02-16 PROCEDURE — 97530 THERAPEUTIC ACTIVITIES: CPT | Performed by: OCCUPATIONAL THERAPIST

## 2022-02-16 PROCEDURE — 97022 WHIRLPOOL THERAPY: CPT | Performed by: OCCUPATIONAL THERAPIST

## 2022-02-16 PROCEDURE — 97140 MANUAL THERAPY 1/> REGIONS: CPT | Performed by: OCCUPATIONAL THERAPIST

## 2022-02-16 NOTE — PROGRESS NOTES
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 Edema control, modalities prn  Frequency 3x week for 6 weeks      Assessment of current deficits   []? Functional mobility             [x]? ADLs          [x]? Strength                  []? Cognition   []? Functional transfers           [x]? IADLs         []? Safety Awareness  []? Endurance   [x]? Fine Motor Coordination    []? Balance      []? Vision/perception   [x]? Sensation     []? Gross Motor Coordination [x]? ROM           [x]? Pain                        [x]? Edema          [x]? Scar Adhesion/Skin Integrity      OT PLAN OF CARE   OT POC based on physician orders, patient diagnosis and results of clinical assessment     Frequency/Duration: 2-3x/ week x 6 weeks  Specific OT Treatment to include     [x]? Instruction in HEP                   Modalities:  [x]?  Therapeutic Exercise                 [x]? Ultrasound               []? Electrical Stimulation/Attended  [x]? PROM/Stretching                    [x]? Fluidotherapy          [x]?   Paraffin                   [x]? AAROM  [x]?  AROM                 []? Iontophoresis:   []? Tendon Lear Gallon  []? Neuromuscular Re-Ed            []? ADL/IADL re-training    [x]?  Therapeutic Activity                  [x]? Pain Management with/without modalities PRN                 [x]?  Manual Therapy                      []? Splinting                                   [x]? Scar Management                   []?Joint Protection/Training  []? Ergonomics                             []? Joint Mobilization                      []? Adaptive Equipment Assessment/Training                             [x]?  Manual Edema Mobilization   []? Myofascial Release                 []? Energy Conservation/Work Simplification  [x]? GM/FM Coordination                []? Safety retraining/education per  individual diagnosis/goals  [x]? Desensitization        Patient Specific Goal: \" To get better and get back to work. \"                             GOALS (Long term same as Short term):  1) Patient will demonstrate good understanding of home program (exercises/activities/diagnosis/prognosis/goals) with good accuracy. 2) Patient will demonstrate increased active/passive range of motion of their R thumb/ wrist to [facility] for ADL/IADL completion. 3) Patient will demonstrate  /pinch strength of at least 50# / 5 pinch pounds of their R hand. 4) Patient to report decreased pain in their affected r distal upper extremity from 4-6/10 with light movement  to 3/10 or less with resistive functional use. 5) Increase in fine motor function as evidenced by <25 sec to complete 9-hole peg test with the right hand. 6) Patient to report a decrease in hypersensitivity from 90% to 20% or less in their R thumb. 7) Patient to demonstrate decreased guarding of their affected extremity from 75% to 20% or less.    8) Patient will be knowledgeable of edema control techniques as evident with decreases from moderate to mild/none. 9) Patient will demonstrate a non-tender/non-adherent scar. 10) Patient will report ADL/ IADL functions as I using the right thumb/ hand. 11) Patient will decrease QuickDASH score to 30% or less for increased participation in daily functional activities.      Pain Level: 2 on scale of 1-10, sore, tingling/ numb, hypersensitive, tight in the R thumb. Subjective: Pt presnts with no new complaints. Objective:  Updated POC to be completed by 2-18-22. INTERVENTION: COMPLETED: SPECIFICS/COMMENTS:   Modality:     Fluidotherapy R hand with AROM- low heat x  10 min for desensitization and to decrease joint stiffness        AROM:     Wrist AROM x - AROM all planes   Thumb AROM X  X    x    x    x    x - thumb AROM ( extension, ABD, MP flexion)  - opposition ex- using xsm beads to IF, MF and RF only - over extend upon release.   - Dig into bag for discs this date for desensitization also. - string disc's, holding string with R - 16 pieces. -medium screw/ washer bolt assembly/ 2x on and off   -towel task with thumb- 5 reps   -Exercise balls CLW   Coordination  ex     FM tasks       x       -gentle opposition ex with Bunchems balls 10 pieces   - lg screws - take apart 5 pieces  - [employer]ue pegboard assembly-   -tweezers -  buttons - 10 lateral pinch but not on his sore spot and kristopher 3 for 3 pt pinch. PROM/Stretching:     Thumb PROM X   ABD/ADD, ext, MP flexion      Palmar WB with thumb in radial ext x Gentle stretches   Scar Mass/Edema Control:     Retrograde massage X         - R radial hand/ palm- beginning and end of session  -x lg gel finger sleeve - lost it - looking for   - mesh sleeve twisted at end and doubled. Wear as needed        Strengthening:     Hand strengthening       x - gentle gripping/ xsoft putty  - velcro board - small lateral pinch - one pass down only 2* to ^'ed pain. - lateral pinch - remove beads from xsoft putty x's 5 pieces. Other:     Desensitization x - beans and rice bins, tactile sensory ex/ challenging,  Fluidotherapy. Rice bin- removed items only - too painful    Work simplification- discussed possible modifications X  X  x - cutting vinyl trimming  - using a broom  - applying cardboard sleeve and staple     Handwriting/ functional tasks        - red tubing provided to help to ease prehension needed for writing.   - discussed impiortance of writing tool size  - folding towels encouraging pinching with R  Hand - 5 towels. Assessment/Comments: Pt is making Good+ progress toward stated plan of care. Pt has returned to work on light duty. He reports overusing the non-affected hand. Pt cautioned to modify thumb portion of work gloves as a safety precaution for the thumb. Pt. Tolerated all exercises and stretches today. Hypersensitivity is getting better but remain a big issue at one specific spot on the volar, radial thumb. Will continue to advance as tolerated. -Rehab Potential: Good  -Requires OT Follow Up: Yes    Time In: 1600           Time Out: 1655             Visit #: 12    Treatment Charges: Mins Time in - Time out  Units   Modalities: FL 10 2631-0144 1   Ther Exercise 20 2472-6584 1   Manual Therapy 10 6248-7975 1   Thera Activities 15 9773-9536 1   ADL/Home Mgt       Neuro Re-education      Gait Training      Group Therapy      Non-Billable Service Time      Other      Total Time/Units 55  4         -Response to Treatment: Pt is happy with his progress. He has a prothestic appointment with Rachel and is excited. Goals: Goals for pt can be seen on initial eval occurring on 1-19-22    Plan:   [x]  Continue Plan of care: Continue ROM, edema control, desensitization and FM tasks. Advance resistive strengthening as tolerated. Treatment covered based on POC and graduated to patient's progress. Pt education continues at each visit to obtain maximum benefits from skilled OT intervention.   []  [place] Plan of care:   [] Discharge:      [address], OT/L, [id number]

## 2022-02-18 ENCOUNTER — TREATMENT (OUTPATIENT)
Dept: OCCUPATIONAL THERAPY | Age: 27
End: 2022-02-18
Payer: COMMERCIAL

## 2022-02-18 DIAGNOSIS — S68.511A COMPLETE TRAUMATIC AMPUTATION OF RIGHT THUMB THROUGH PHALANX, INITIAL ENCOUNTER: Primary | ICD-10-CM

## 2022-02-18 PROCEDURE — 97530 THERAPEUTIC ACTIVITIES: CPT | Performed by: OCCUPATIONAL THERAPIST

## 2022-02-18 PROCEDURE — 97022 WHIRLPOOL THERAPY: CPT | Performed by: OCCUPATIONAL THERAPIST

## 2022-02-18 PROCEDURE — 97110 THERAPEUTIC EXERCISES: CPT | Performed by: OCCUPATIONAL THERAPIST

## 2022-02-18 NOTE — PROGRESS NOTES
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 Edema control, modalities prn  Frequency 3x week for 6 weeks      Assessment of current deficits   []? Functional mobility             [x]? ADLs          [x]? Strength                  []? Cognition   []? Functional transfers           [x]? IADLs         []? Safety Awareness  []? Endurance   [x]? Fine Motor Coordination    []? Balance      []? Vision/perception   [x]? Sensation     []? Gross Motor Coordination [x]? ROM           [x]? Pain                        [x]? Edema          [x]? Scar Adhesion/Skin Integrity      OT PLAN OF CARE   OT POC based on physician orders, patient diagnosis and results of clinical assessment     Frequency/Duration: 2-3x/ week x 6 weeks  Specific OT Treatment to include     [x]? Instruction in HEP                   Modalities:  [x]?  Therapeutic Exercise                 [x]? Ultrasound               []? Electrical Stimulation/Attended  [x]? PROM/Stretching                    [x]? Fluidotherapy          [x]?   Paraffin                   [x]? AAROM  [x]?  AROM                 []? Iontophoresis:   []? Tendon Shayna Anne-Marie  []? Neuromuscular Re-Ed            []? ADL/IADL re-training    [x]?  Therapeutic Activity                  [x]? Pain Management with/without modalities PRN                 [x]?  Manual Therapy                      []? Splinting                                   [x]? Scar Management                   []?Joint Protection/Training  []? Ergonomics                             []? Joint Mobilization                      []? Adaptive Equipment Assessment/Training                             [x]?  Manual Edema Mobilization   []? Myofascial Release                 []? Energy Conservation/Work Simplification  [x]? GM/FM Coordination                []? Safety retraining/education per  individual diagnosis/goals  [x]? Desensitization        Patient Specific Goal: \" To get better and get back to work. \"                             GOALS (Long term same as Short term):  1) Patient will demonstrate good understanding of home program (exercises/activities/diagnosis/prognosis/goals) with good accuracy. 2) Patient will demonstrate increased active/passive range of motion of their R thumb/ wrist to [facility] for ADL/IADL completion. 3) Patient will demonstrate  /pinch strength of at least 50# / 5 pinch pounds of their R hand. 4) Patient to report decreased pain in their affected r distal upper extremity from 4-6/10 with light movement  to 3/10 or less with resistive functional use. 5) Increase in fine motor function as evidenced by <25 sec to complete 9-hole peg test with the right hand. 6) Patient to report a decrease in hypersensitivity from 90% to 20% or less in their R thumb. 7) Patient to demonstrate decreased guarding of their affected extremity from 75% to 20% or less.    8) Patient will be knowledgeable of edema control techniques as evident with decreases from moderate to mild/none. 9) Patient will demonstrate a non-tender/non-adherent scar. 10) Patient will report ADL/ IADL functions as I using the right thumb/ hand. 11) Patient will decrease QuickDASH score to 30% or less for increased participation in daily functional activities.      Pain Level: 2 on scale of 1-10, sore, tingling/ numb, hypersensitive, tight in the R thumb. Subjective: Pt presents with no new complaints. Objective:  Updated POC to be completed by 2-18-22. INTERVENTION: COMPLETED: SPECIFICS/COMMENTS:   Modality:     Fluidotherapy R hand with AROM- low heat x  10 min for desensitization and to decrease joint stiffness        AROM:     Wrist AROM x - AROM all planes   Thumb AROM X          x    x    x - thumb AROM ( extension, ABD, MP flexion)  - opposition ex- using xsm beads to IF, MF and RF only - over extend upon release.   - Dig into bag for discs this date for desensitization also. - string disc's, holding string with R - 16 pieces. -medium screw/ washer bolt assembly/ 2x on and off   -towel task with thumb- 5 reps   -Exercise balls CLW   Coordination  ex     FM tasks       x        x -gentle opposition ex with Bunchems balls 10 pieces   - lg screws - take apart 5 pieces  - Purdue pegboard assembly-   -tweezers -  buttons - 10 lateral pinch but not on his sore spot and kristopher 3 for 3 pt pinch. - handful of bunchems / place 1 at a time using in hand manipulation        PROM/Stretching:     Thumb PROM X   ABD/ADD, ext, MP flexion      Palmar WB with thumb in radial ext x Gentle stretches   Scar Mass/Edema Control:     Retrograde massage X         - R radial hand/ palm- beginning and end of session  -x lg gel finger sleeve - lost it - looking for   - mesh sleeve twisted at end and doubled.  Wear as needed        Strengthening:     Hand strengthening           X  x - gentle gripping/ xsoft putty  - velcro board - small lateral pinch - one pass down only 2* to [employer] pain.   - lateral pinch - remove beads from xsoft putty x's 5 pieces. Hand gripper- rung 4 2-20  - julia bar 10# red- 20x twist in/ out and bending        Other:     Desensitization X  Better tolerance - beans and rice bins, tactile sensory ex,  Fluidotherapy. Rice bin   Work simplification- discussed possible modifications  - cutting vinyl trimming  - using a broom  - applying cardboard sleeve and staple     Handwriting/ functional tasks        - red tubing provided to help to ease prehension needed for writing.   - discussed importance of writing tool size  - folding towels encouraging pinching with R  Hand - 5 towels. Assessment/Comments: Pt is making Good+ progress toward stated plan of care. ROM, strengthening and desensitization continued with good tolerance. Swelling in the thumb is increased more this week as he has RTW. Tolerance for desensitization continues to improve. Pt states items used to feel like rocks but not feels just uncomfortable. Will continue to advance as tolerated. -Rehab Potential: Good  -Requires OT Follow Up: Yes    Time In: 0735           Time Out: 9242             Visit #: 13    Treatment Charges: Mins Time in - Time out  Units   Modalities: FL 10 0740-6775 1   Ther Exercise 15 6912-5350 1   Manual Therapy      Thera Activities 20 8647-8551 1   ADL/Home Mgt       Neuro Re-education      Gait Training      Group Therapy      Non-Billable Service Time      Other      Total Time/Units 45  3         -Response to Treatment: Pt is happy with his progress. Tolerance for his 1st week back to work is fair with an increase in thumb symptoms. Goals: Goals for pt can be seen on initial eval occurring on 1-19-22    Plan:   [x]  Continue Plan of care: Re-evaluate at next visit. Continue ROM, edema control, desensitization and FM tasks. Advance resistive strengthening as tolerated. Treatment covered based on POC and graduated to patient's progress.  Pt education continues at each visit to obtain maximum benefits from skilled OT intervention.   []  [address] of care:   []  Discharge:      Baron Mcclendon, OT/L, [id number]

## 2022-02-21 ENCOUNTER — TREATMENT (OUTPATIENT)
Dept: OCCUPATIONAL THERAPY | Age: 27
End: 2022-02-21
Payer: COMMERCIAL

## 2022-02-21 DIAGNOSIS — S68.511A COMPLETE TRAUMATIC AMPUTATION OF RIGHT THUMB THROUGH PHALANX, INITIAL ENCOUNTER: Primary | ICD-10-CM

## 2022-02-21 PROCEDURE — 97110 THERAPEUTIC EXERCISES: CPT | Performed by: OCCUPATIONAL THERAPIST

## 2022-02-21 PROCEDURE — 97140 MANUAL THERAPY 1/> REGIONS: CPT | Performed by: OCCUPATIONAL THERAPIST

## 2022-02-21 PROCEDURE — 97022 WHIRLPOOL THERAPY: CPT | Performed by: OCCUPATIONAL THERAPIST

## 2022-02-21 PROCEDURE — 97530 THERAPEUTIC ACTIVITIES: CPT | Performed by: OCCUPATIONAL THERAPIST

## 2022-02-21 NOTE — PROGRESS NOTES
[place] [facility] [address]  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 Edema control, modalities prn  Frequency 3x week for 6 weeks      Assessment of current deficits   []? Functional mobility             [x]? ADLs          [x]? Strength                  []? Cognition   []? Functional transfers           [x]? IADLs         []? Safety Awareness  []? Endurance   [x]? Fine Motor Coordination    []? Balance      []? Vision/perception   [x]? Sensation     []? Gross Motor Coordination [x]? ROM           [x]? Pain                        [x]? Edema          [x]? Scar Adhesion/Skin Integrity      OT PLAN OF CARE   OT POC based on physician orders, patient diagnosis and results of clinical assessment     Frequency/Duration: 2-3x/ week x 6 weeks  Specific OT Treatment to include     [x]? Instruction in HEP                   Modalities:  [x]?  Therapeutic Exercise                 [x]? Ultrasound               []? Electrical Stimulation/Attended  [x]? PROM/Stretching                    [x]? Fluidotherapy          [x]?   Paraffin                   [x]? AAROM  [x]?  AROM                 []? Iontophoresis:   []? Tendon Thedora Fannie  []? Neuromuscular Re-Ed            []? ADL/IADL re-training    [x]?  Therapeutic Activity                  [x]? Pain Management with/without modalities PRN                 [x]?  Manual Therapy                      []? Splinting                                   [x]? Scar Management                   []?Joint Protection/Training  []? Ergonomics                             []? Joint Mobilization                      []? Adaptive Equipment Assessment/Training                             [x]?  Manual Edema Mobilization   []? Myofascial Release                 []? Energy Conservation/Work Simplification  [x]? GM/FM Coordination                []? Safety retraining/education per  individual diagnosis/goals  [x]? Desensitization        Patient Specific Goal: \" To get better and get back to work. \"                             GOALS (Long term same as Short term):  1) Patient will demonstrate good understanding of home program (exercises/activities/diagnosis/prognosis/goals) with good accuracy. Goal in progress. 2) Patient will demonstrate increased active/passive range of motion of their R thumb/ wrist to [facility] for ADL/IADL completion. Goal making good gains. 3) Patient will demonstrate  /pinch strength of at least 50# / 5 pinch pounds of their R hand. Goal recently initiated. Nikki Sarah 4) Patient to report decreased pain in their affected r distal upper extremity from 4-6/10 with light movement  to 3/10 or less with resistive functional use. Goal making fair progress. 5) Increase in fine motor function as evidenced by <25 sec to complete 9-hole peg test with the right hand. Goal making fair progress. 6) Patient to report a decrease in hypersensitivity from 90% to 20% or less in their R thumb. Goal making fair progress.  Hypersensitivity decreased to 70% of the time. 7) Patient to demonstrate decreased guarding of their affected extremity from 75% to 20% or less. Goal making fair gains. Pt is guarding about 50% of the time. 8) Patient will be knowledgeable of edema control techniques as evident with decreases from moderate to mild/none. Goal making good gains. 9) Patient will demonstrate a non-tender/non-adherent scar. Goal making good gains. 10) Patient will report ADL/ IADL functions as I using the right thumb/ hand. Goal making fair gains. 11) Patient will decrease QuickDASH score to 30% or less for increased participation in daily functional activities. Goal making slow gains.      Pain Level: 2 - 6 on scale of 1-10, sore, tingling/ numb, hypersensitive, tight in the R thumb. Subjective: Pt states \" The job I had today at work in the cold water really irritated my thumb. By the end of the work day my thumb sometimes hurts to a 6/10 - the sensativity is ^'ed. \"  \"    Objective:  Updated POC to be completed by 3-21-22. INTERVENTION: COMPLETED: SPECIFICS/COMMENTS:   Modality:     Fluidotherapy R hand with AROM- low heat x  10 min for desensitization and to decrease joint stiffness        AROM:     Wrist AROM x - AROM all planes   Thumb AROM X      x            x - thumb AROM ( extension, ABD, MP flexion)  - opposition ex- using xsm beads to IF, MF and RF only - over extend upon release.   - Dig into bag for discs this date for desensitization also. - string disc's, holding string with R - 16 pieces. -medium screw/ washer bolt assembly/ 2x on and off   -towel task with thumb- 5 reps   -Exercise balls CLW   Coordination  ex     FM tasks         x       -gentle opposition ex with Bunchems balls 10 pieces   - lg screws - take apart 5 pieces  - Purdue pegboard assembly-   -tweezers -  buttons - 10 lateral pinch but not on his sore spot and kristopher 3 for 3 pt pinch.       - handful of bunchems / place 1 at a time using in hand manipulation        PROM/Stretching:     Thumb PROM X   ABD/ADD, ext, MP flexion      Palmar WB with thumb in radial ext x Gentle stretches   Scar Mass/Edema Control:     Retrograde massage X         - R radial hand/ palm- beginning and end of session  -x lg gel finger sleeve - lost it - looking for   - mesh sleeve twisted at end and doubled. Wear as needed        Strengthening:     Hand strengthening             X    X      x - gentle gripping/ xsoft putty  - velcro board - small lateral pinch - one pass down only 2* to ^'ed pain. - lateral pinch - remove beads from xsoft putty x's 5 pieces. Hand gripper- rung 4 2-20  - julia bar 10# red- 20x twist in/ out and bending  - yellow clothespins - 15 lateral and 15 3 pt pinch -  pom poms then 10 with red lateral and 5- 3 pt   -PVC pipe building activity - less pain assembly but ^'ed pain with tearing down 2* to [employer]. One task        Other:     Desensitization   Better tolerance - beans and rice bins, tactile sensory ex,  Fluidotherapy. Rice bin   Work simplification- discussed possible modifications  - cutting vinyl trimming  - using a broom  - applying cardboard sleeve and staple     Handwriting/ functional tasks        - red tubing provided to help to ease prehension needed for writing.   - discussed importance of writing tool size  - folding towels encouraging pinching with R  Hand - 5 towels. Assessment/Comments: Pt is making Good+ progress toward stated plan of care. ROM, strengthening and desensitization continued with fair +  tolerance. . Tolerance for desensitization continues to improve but he shea note at the end of the work day his thumb is more hypersensitive depending on what he did. Focus will shift tolerating more resisitive tasks wilth desitization. Therapist requesting a date extension to 3/5/22 and requesting a new C- 9. Will continue to advance as tolerated.       Pain Level: IE : 4-6 on scale of 1-10, stinging, pins and needles,  tight (pulling) and uncomfortable       2/21/2022 : at rest 0/10. With light activity ; thumb is sensitive 3-4/10. End of the work day ^'es to 6-7/10 at times                          With resistive tasks :  4-5/10. UE Assessment: RHD     R UE AROM:             1-18-22 2/21/22     Wrist flexion   0-55  75*     Wrist extension  0-65  70*      RD  0-18  20*      UD  0-42  45*      Thumb palmar ABD  0-53  0 -70*      Thumb radial ext  0-50  0 - 69*      MP  0-28  0 - 36*      CMC  0-30        Opposition  intact to index only  to RF laterally      Comments: AROM in the  R hand non-involved digits is very good.     Sensation:  R  IE:  All light touch and deep touch is intact for the right hand . However, paresthesias and significant hypersensitivity in the right thumb is present. 2/21/22: PT 's hypersensativity ranges from sever to moderate around his thumb ulnarly and on the tip.       Dynamometer (setting 2): Assessed this date. Left:   82#      Right:   46#       Pinch Meter:   Lateral: Left= 6#- ^'ed pain., Right= 20#    Palmar 3 point: Left= 4#  ^'ed pain, Right= 15#    9 Hole Peg Test:   Left:  : 18.18   Right:    :40.03                 QuickDASH Score: 48% disability  To 57% disability      -Rehab Potential: Good  -Requires OT Follow Up: Yes    Time In:  3:00 pm           Time Out: 4:00 pm              Visit #: 14    Treatment Charges: Mins Time in - Time out  Units   Modalities: FL 10 3:00 - 3:10 p 1   Ther Exercise 15 3:45 - 4:00 p 1   Manual Therapy 15 3:10 - 3:25 p 1   Thera Activities 20 3:25 - 3:45 p 1   ADL/Home Mgt       Neuro Re-education      Gait Training      Group Therapy      Non-Billable Service Time      Other      Total Time/Units 60  4         -Response to Treatment: Pt is happy with his progress.  Tolerances work with ^'ed sensitivity in this thumb by end of the day and pian with resistive tasks ( pt trying not to do resistive tasks as he is on light duty). Goals: Goals for pt can be seen on initial eval occurring on 1-19-22 and re assessment on 2/21/22. Plan:   [x]  Continue Plan of care: Continue to treat pt 2 - 3 x's a week to work on the above mentioned goals for 6 weeks. Requesting a new C -90 and date extension on this  C-9. Donzell Pyo Continue ROM, edema control, desensitization and FM tasks. Advance resistive strengthening as tolerated. Treatment covered based on POC and graduated to patient's progress. Pt education continues at each visit to obtain maximum benefits from skilled OT intervention.   []  Alter Plan of care:   []  Discharge:      Lula Lyman OT/L, CHT

## 2022-02-23 ENCOUNTER — TREATMENT (OUTPATIENT)
Dept: OCCUPATIONAL THERAPY | Age: 27
End: 2022-02-23
Payer: COMMERCIAL

## 2022-02-23 DIAGNOSIS — S68.511A COMPLETE TRAUMATIC AMPUTATION OF RIGHT THUMB THROUGH PHALANX, INITIAL ENCOUNTER: Primary | ICD-10-CM

## 2022-02-23 PROCEDURE — 97140 MANUAL THERAPY 1/> REGIONS: CPT | Performed by: OCCUPATIONAL THERAPIST

## 2022-02-23 PROCEDURE — 97022 WHIRLPOOL THERAPY: CPT | Performed by: OCCUPATIONAL THERAPIST

## 2022-02-23 PROCEDURE — 97530 THERAPEUTIC ACTIVITIES: CPT | Performed by: OCCUPATIONAL THERAPIST

## 2022-02-23 PROCEDURE — 97110 THERAPEUTIC EXERCISES: CPT | Performed by: OCCUPATIONAL THERAPIST

## 2022-02-23 NOTE — PROGRESS NOTES
[facility] [address]  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 Edema control, modalities prn  Frequency 3x week for 6 weeks      Assessment of current deficits   []? Functional mobility             [x]? ADLs          [x]? Strength                  []? Cognition   []? Functional transfers           [x]? IADLs         []? Safety Awareness  []? Endurance   [x]? Fine Motor Coordination    []? Balance      []? Vision/perception   [x]? Sensation     []? Gross Motor Coordination [x]? ROM           [x]? Pain                        [x]? Edema          [x]? Scar Adhesion/Skin Integrity      OT PLAN OF CARE   OT POC based on physician orders, patient diagnosis and results of clinical assessment     Frequency/Duration: 2-3x/ week x 6 weeks  Specific OT Treatment to include     [x]? Instruction in HEP                   Modalities:  [x]?  Therapeutic Exercise                 [x]? Ultrasound               []? Electrical Stimulation/Attended  [x]? PROM/Stretching                    [x]? Fluidotherapy          [x]?   Paraffin                   [x]? AAROM  [x]?  AROM                 []? Iontophoresis:   []? Tendon Geri Nearing  []? Neuromuscular Re-Ed            []? ADL/IADL re-training    [x]?  Therapeutic Activity                  [x]? Pain Management with/without modalities PRN                 [x]?  Manual Therapy                      []? Splinting                                   [x]? Scar Management                   []?Joint Protection/Training  []? Ergonomics                             []? Joint Mobilization                      []? Adaptive Equipment Assessment/Training                             [x]?  Manual Edema Mobilization   []? Myofascial Release                 []? Energy Conservation/Work Simplification  [x]? GM/FM Coordination                []? Safety retraining/education per  individual diagnosis/goals  [x]? Desensitization        Patient Specific Goal: \" To get better and get back to work. \"                             GOALS (Long term same as Short term): updated 2-21-22  1) Patient will demonstrate good understanding of home program (exercises/activities/diagnosis/prognosis/goals) with good accuracy. Goal in progress. 2) Patient will demonstrate increased active/passive range of motion of their R thumb/ wrist to [facility] for ADL/IADL completion. Goal making good gains. 3) Patient will demonstrate  /pinch strength of at least 50# / 5 pinch pounds of their R hand. Goal recently initiated. Meryle Sandman 4) Patient to report decreased pain in their affected r distal upper extremity from 4-6/10 with light movement  to 3/10 or less with resistive functional use. Goal making fair progress. 5) Increase in fine motor function as evidenced by <25 sec to complete 9-hole peg test with the right hand. Goal making fair progress. 6) Patient to report a decrease in hypersensitivity from 90% to 20% or less in their R thumb. Goal making fair progress.  Hypersensitivity decreased to 70% of the time. 7) Patient to demonstrate decreased guarding of their affected extremity from 75% to 20% or less. Goal making fair gains. Pt is guarding about 50% of the time. 8) Patient will be knowledgeable of edema control techniques as evident with decreases from moderate to mild/none. Goal making good gains. 9) Patient will demonstrate a non-tender/non-adherent scar. Goal making good gains. 10) Patient will report ADL/ IADL functions as I using the right thumb/ hand. Goal making fair gains. 11) Patient will decrease QuickDASH score to 30% or less for increased participation in daily functional activities. Goal making slow gains.      Pain Level: 2 - 6 on scale of 1-10, sore, tingling/ numb, hypersensitive, tight in the R thumb. Subjective: \" I went to . They took a video of my thumb to see if I qualify for a Naked Prosthetic. \"  Objective:  Updated POC to be completed by 3-21-22. INTERVENTION: COMPLETED: SPECIFICS/COMMENTS:   Modality:     Fluidotherapy R hand with AROM- low heat x  10 min for desensitization and to decrease joint stiffness        AROM:     Wrist AROM x - AROM all planes   Thumb AROM X                  x - thumb AROM ( extension, ABD, MP flexion)  - opposition ex- using xsm beads to IF, MF and RF only - over extend upon release.   - Dig into bag for discs this date for desensitization also. - string disc's, holding string with R - 16 pieces. -medium screw/ washer bolt assembly/ 2x on and off   -towel task with thumb- 5 reps   -Exercise balls CLW   Coordination  ex     FM tasks                -gentle opposition ex with Bunchems balls 10 pieces   - lg screws - take apart 5 pieces  - Purdue pegboard assembly-   -tweezers -  buttons - 10 lateral pinch but not on his sore spot and kristopher 3 for 3 pt pinch.       - handful of bunchems / place 1 at a time using in hand manipulation        PROM/Stretching:     Thumb PROM X   ABD/ADD, ext, MP flexion Palmar WB with thumb in radial ext x Gentle stretches   Scar Mass/Edema Control:     Retrograde massage X         - R radial hand/ palm- beginning and end of session  -x lg gel finger sleeve - lost it - looking for   - mesh sleeve twisted at end and doubled. Wear as needed        Strengthening:     Hand strengthening   x          X    X            x - gentle gripping/ xsoft putty  - velcro board - small lateral pinch - 2x, pinch and pull of tabs  - lateral pinch - remove beads from xsoft putty x's 5 pieces. Hand gripper- rung 4 2-20  - julia bar 10# red- 20x twist in/ out and bending  - yellow clothespins - 15 lateral and 15 3 pt pinch -  pom poms then 10 with red lateral and 5- 3 pt   -PVC pipe building activity - less pain assembly but ^'ed pain with tearing down 2* to [employer]. One task  - Cup fill with marbles and 2 exercise  balls/ pour out        Other:     Desensitization   Better tolerance - beans and rice bins, tactile sensory ex,  Fluidotherapy. Rice bin   Work simplification- discussed possible modifications  - cutting vinyl trimming  - using a broom  - applying cardboard sleeve and staple     Handwriting/ functional tasks        - red tubing provided to help to ease prehension needed for writing.   - discussed importance of writing tool size  - folding towels encouraging pinching with R  Hand - 5 towels. Assessment/Comments: Pt is making Good+ progress toward stated plan of care. ROM, strengthening and desensitization continued with improving  tolerance.  . Will follow up on prior therapist request for C9 date extension and new C9 to continue treatment.         -Rehab Potential: Good  -Requires OT Follow Up: Yes    Time In:  3:00 pm           Time Out: 4:00 pm              Visit #: 15    Treatment Charges: Mins Time in - Time out  Units   Modalities: FL 10 3:00 - 3:10 p 1   Ther Exercise 15 3:45 - 4:00 p 1   Manual Therapy 15 3:10 - 3:25 p 1   Thera Activities 20 3:25 - 3:45 p 1 ADL/Home Mgt       Neuro Re-education      Gait Training      Group Therapy      Non-Billable Service Time      Other      Total Time/Units 60  4         -Response to Treatment: Pt is happy with his progress. He is back to work on light duty and reports his thumb has been bothering him more. Will monitor. Goals: Goals for pt can be seen on initial eval occurring on 1-19-22 and re assessment on 2/21/22. Plan:   [x]  Continue Plan of care: Continue to treat pt 2 - 3 x's a week to work on the above mentioned goals for 6 weeks. Requesting a new C -90 and date extension on this  C-9. Narinder Ruffin Continue ROM, edema control, desensitization and FM tasks. Advance resistive strengthening as tolerated. Treatment covered based on POC and graduated to patient's progress. Pt education continues at each visit to obtain maximum benefits from skilled OT intervention.   []  [address] [facility] of care:   []  Discharge:      Brady Subramanian OT/L, [id number]

## 2022-02-25 ENCOUNTER — TREATMENT (OUTPATIENT)
Dept: OCCUPATIONAL THERAPY | Age: 27
End: 2022-02-25
Payer: COMMERCIAL

## 2022-02-25 DIAGNOSIS — S68.511A COMPLETE TRAUMATIC AMPUTATION OF RIGHT THUMB THROUGH PHALANX, INITIAL ENCOUNTER: Primary | ICD-10-CM

## 2022-02-25 PROCEDURE — 97022 WHIRLPOOL THERAPY: CPT | Performed by: OCCUPATIONAL THERAPY ASSISTANT

## 2022-02-25 PROCEDURE — 97140 MANUAL THERAPY 1/> REGIONS: CPT | Performed by: OCCUPATIONAL THERAPY ASSISTANT

## 2022-02-25 PROCEDURE — 97110 THERAPEUTIC EXERCISES: CPT | Performed by: OCCUPATIONAL THERAPY ASSISTANT

## 2022-02-25 PROCEDURE — 97530 THERAPEUTIC ACTIVITIES: CPT | Performed by: OCCUPATIONAL THERAPY ASSISTANT

## 2022-02-25 NOTE — PROGRESS NOTES
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 Edema control, modalities prn  Frequency 3x week for 6 weeks      Assessment of current deficits   []? Functional mobility             [x]? ADLs          [x]? Strength                  []? Cognition   []? Functional transfers           [x]? IADLs         []? Safety Awareness  []? Endurance   [x]? Fine Motor Coordination    []? Balance      []? Vision/perception   [x]? Sensation     []? Gross Motor Coordination [x]? ROM           [x]? Pain                        [x]? Edema          [x]? Scar Adhesion/Skin Integrity      OT PLAN OF CARE   OT POC based on physician orders, patient diagnosis and results of clinical assessment     Frequency/Duration: 2-3x/ week x 6 weeks  Specific OT Treatment to include     [x]? Instruction in HEP                   Modalities:  [x]?  Therapeutic Exercise                 [x]? Ultrasound               []? Electrical Stimulation/Attended  [x]? PROM/Stretching                    [x]? Fluidotherapy          [x]?   Paraffin                   [x]? AAROM  [x]?  AROM                 []? Iontophoresis:   []? Tendon Susan Blocker  []? Neuromuscular Re-Ed            []? ADL/IADL re-training    [x]?  Therapeutic Activity                  [x]? Pain Management with/without modalities PRN                 [x]?  Manual Therapy                      []? Splinting                                   [x]? Scar Management                   []?Joint Protection/Training  []? Ergonomics                             []? Joint Mobilization                      []? Adaptive Equipment Assessment/Training                             [x]?  Manual Edema Mobilization   []? Myofascial Release                 []? Energy Conservation/Work Simplification  [x]? GM/FM Coordination                []? Safety retraining/education per  individual diagnosis/goals  [x]? Desensitization        Patient Specific Goal: \" To get better and get back to work. \"                             GOALS (Long term same as Short term): updated 2-21-22  1) Patient will demonstrate good understanding of home program (exercises/activities/diagnosis/prognosis/goals) with good accuracy. Goal in progress. 2) Patient will demonstrate increased active/passive range of motion of their R thumb/ wrist to [facility] for ADL/IADL completion. Goal making good gains. 3) Patient will demonstrate  /pinch strength of at least 50# / 5 pinch pounds of their R hand. Goal recently initiated. Silver Orozco 4) Patient to report decreased pain in their affected r distal upper extremity from 4-6/10 with light movement  to 3/10 or less with resistive functional use. Goal making fair progress. 5) Increase in fine motor function as evidenced by <25 sec to complete 9-hole peg test with the right hand. Goal making fair progress. 6) Patient to report a decrease in hypersensitivity from 90% to 20% or less in their R thumb. Goal making fair progress.  Hypersensitivity decreased to 70% of the time. 7) Patient to demonstrate decreased guarding of their affected extremity from 75% to 20% or less. Goal making fair gains. Pt is guarding about 50% of the time. 8) Patient will be knowledgeable of edema control techniques as evident with decreases from moderate to mild/none. Goal making good gains. 9) Patient will demonstrate a non-tender/non-adherent scar. Goal making good gains. 10) Patient will report ADL/ IADL functions as I using the right thumb/ hand. Goal making fair gains. 11) Patient will decrease QuickDASH score to 30% or less for increased participation in daily functional activities. Goal making slow gains.      Pain Level: 2 - 6 on scale of 1-10, sore, tingling/ numb, hypersensitive, tight in the R thumb. Subjective: Pt stated he had difficulty cleaning equipment at work today. Objective:  Updated POC to be completed by 3-21-22. INTERVENTION: COMPLETED: SPECIFICS/COMMENTS:   Modality:     Fluidotherapy R hand with AROM- low heat x  10 min for desensitization and to decrease joint stiffness        AROM:     Wrist AROM x - AROM all planes   Thumb AROM X                  X  x - thumb AROM ( extension, ABD, MP flexion)  - opposition ex- using xsm beads to IF, MF and RF only - over extend upon release.   - Dig into bag for discs this date for desensitization also. - string disc's, holding string with R - 16 pieces. -medium screw/ washer bolt assembly/ 2x on and off   -towel task with thumb- 5 reps   -Exercise balls CLW   Coordination  ex     FM tasks                -gentle opposition ex with Bunchems balls 10 pieces   - lg screws - take apart 5 pieces  - Purdue pegboard assembly-   -tweezers -  buttons - 10 lateral pinch but not on his sore spot and kristopher 3 for 3 pt pinch.       - handful of bunchems / place 1 at a time using in hand manipulation        PROM/Stretching:     Thumb PROM X   ABD/ADD, ext, MP flexion      Palmar WB with thumb in radial ext x Gentle stretches   Scar Mass/Edema Control:     Retrograde massage X         - R radial hand/ palm- beginning and end of session  -x lg gel finger sleeve - lost it - looking for   - mesh sleeve twisted at end and doubled. Wear as needed        Strengthening:     Hand strengthening   x        x  X    X             - gentle gripping/ xsoft putty  - velcro board - small lateral pinch - 2x, pinch and pull of tabs  - lateral pinch - remove beads from xsoft putty x's 5 pieces. Hand gripper- rung 4 2-20  - julia bar 15# green^- 10x twist in/ out and bending  - yellow clothespins - 15 lateral and 15 3 pt pinch -  pom poms then 10 with red lateral and 5- 3 pt   -PVC pipe building activity - less pain assembly but ^'ed pain with tearing down 2* to [employer]. One task  - Cup fill with marbles and 2 exercise  balls/ pour out        Other:     Desensitization   Better tolerance - beans and rice bins, tactile sensory ex,  Fluidotherapy. Rice bin   Work simplification- discussed possible modifications  - cutting vinyl trimming  - using a broom  - applying cardboard sleeve and staple     Handwriting/ functional tasks        - red tubing provided to help to ease prehension needed for writing.   - discussed importance of writing tool size  - folding towels encouraging pinching with R  Hand - 5 towels. Assessment/Comments: Pt is making Good+ progress toward stated plan of care. Pt. Tolerated all exercises and stretches today.  Awaiting C-9 extension to continue therapy.          -Rehab Potential: Good  -Requires OT Follow Up: Yes    Time In:  1300          Time Out: 1400           Visit #: 16    Treatment Charges: Mins Time in - Time out  Units   Modalities: FL 10 7925-9383 1   Ther Exercise 15 3492-6347 1   Manual Therapy 15 9629-6117 1   Thera Activities 20 6992-0757 1   ADL/Home Mgt       Neuro Re-education      Gait Training      Group Therapy      Non-Billable Service Time      Other      Total Time/Units 60  4         -Response to Treatment: Pt is happy with his progress. He is back to work on light duty and reports his thumb has been bothering him more. Will monitor. Goals: Goals for pt can be seen on initial eval occurring on 1-19-22 and re assessment on 2/21/22. Plan:   [x]  Continue Plan of care: Continue to treat pt 2 - 3 x's a week to work on the above mentioned goals for 6 weeks. Requesting a new C -90 and date extension on this  C-9. Tone Case Continue ROM, edema control, desensitization and FM tasks. Advance resistive strengthening as tolerated. Treatment covered based on POC and graduated to patient's progress. Pt education continues at each visit to obtain maximum benefits from skilled OT intervention.   []  [address] [facility] of care:   []  Discharge:      Denisa Brownlee [facility] [id number], [id number]

## 2022-02-28 ENCOUNTER — TREATMENT (OUTPATIENT)
Dept: OCCUPATIONAL THERAPY | Age: 27
End: 2022-02-28

## 2022-02-28 DIAGNOSIS — S68.511A COMPLETE TRAUMATIC AMPUTATION OF RIGHT THUMB THROUGH PHALANX, INITIAL ENCOUNTER: Primary | ICD-10-CM

## 2022-02-28 PROCEDURE — 97022 WHIRLPOOL THERAPY: CPT | Performed by: OCCUPATIONAL THERAPIST

## 2022-02-28 PROCEDURE — 97110 THERAPEUTIC EXERCISES: CPT | Performed by: OCCUPATIONAL THERAPIST

## 2022-02-28 PROCEDURE — 97530 THERAPEUTIC ACTIVITIES: CPT | Performed by: OCCUPATIONAL THERAPIST

## 2022-02-28 PROCEDURE — 97140 MANUAL THERAPY 1/> REGIONS: CPT | Performed by: OCCUPATIONAL THERAPIST

## 2022-02-28 NOTE — PROGRESS NOTES
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 Edema control, modalities prn  Frequency 3x week for 6 weeks      Assessment of current deficits   []? Functional mobility             [x]? ADLs          [x]? Strength                  []? Cognition   []? Functional transfers           [x]? IADLs         []? Safety Awareness  []? Endurance   [x]? Fine Motor Coordination    []? Balance      []? Vision/perception   [x]? Sensation     []? Gross Motor Coordination [x]? ROM           [x]? Pain                        [x]? Edema          [x]? Scar Adhesion/Skin Integrity      OT PLAN OF CARE   OT POC based on physician orders, patient diagnosis and results of clinical assessment     Frequency/Duration: 2-3x/ week x 6 weeks  Specific OT Treatment to include     [x]? Instruction in HEP                   Modalities:  [x]?  Therapeutic Exercise                 [x]? Ultrasound               []? Electrical Stimulation/Attended  [x]? PROM/Stretching                    [x]? Fluidotherapy decreased to 70% of the time. 7) Patient to demonstrate decreased guarding of their affected extremity from 75% to 20% or less. Goal making fair gains. Pt is guarding about 50% of the time. 8) Patient will be knowledgeable of edema control techniques as evident with decreases from moderate to mild/none. Goal making good gains. 9) Patient will demonstrate a non-tender/non-adherent scar. Goal making good gains. 10) Patient will report ADL/ IADL functions as I using the right thumb/ hand. Goal making fair gains. 11) Patient will decrease QuickDASH score to 30% or less for increased participation in daily functional activities. Goal making slow gains.      Pain Level: 2 - 6 on scale of 1-10, sore, tingling/ numb, hypersensitive, tight in the R thumb. Subjective: Pt stated he had difficulty cleaning equipment at work today. Objective:  Updated POC to be completed by 3-21-22. INTERVENTION: COMPLETED: SPECIFICS/COMMENTS:   Modality:     Fluidotherapy R hand with AROM- low heat x  10 min for desensitization and to decrease joint stiffness        AROM:     Wrist AROM x - AROM all planes   Thumb AROM X  x                   - thumb AROM ( extension, ABD, MP flexion)  - opposition ex- using marbles - suing RF and SF, did 15 with  pieces to RF - too hard to SF  - over extend upon release.   - Dig into bag for discs this date for desensitization also. - string disc's, holding string with R - 16 pieces. -medium screw/ washer bolt assembly/ 2x on and off   -towel task with thumb- 5 reps   -Exercise balls CLW   Coordination  ex     FM tasks                -gentle opposition ex with Bunchems balls 10 pieces   - lg screws - take apart 5 pieces  - [employer] pegboard assembly-   -tweezers -  buttons - 10 lateral pinch but not on his sore spot and kristopher 3 for 3 pt pinch.       - handful of bunchems / place 1 at a time using in hand manipulation        PROM/Stretching:     Thumb PROM X   ABD/ADD, ext, MP flexion      Palmar WB with thumb in radial ext x Gentle stretches   Scar Mass/Edema Control:     Retrograde massage X         - R radial hand/ palm- beginning and end of session  -x lg gel finger sleeve - lost it - looking for   - mesh sleeve twisted at end and doubled. Wear as needed        Strengthening:     Hand strengthening   x    x          X             - gentle gripping/ xsoft putty  - velcro board - small lateral pinch - 2x, pinch and pull of tabs  - lateral pinch - remove beads ( small washers) from xsoft putty x's 5 pieces. Hand gripper- rung 4 2-20  - julia bar 15# green^- 10x twist in/ out and bending  - ^ to all red  clothespins --  pom poms ^ to  2 sets of 15 lateral and -^ to 15  3 pt pinch   -PVC pipe building activity - less pain assembly but ^'ed pain with tearing down 2* to [employer]. One task  - Cup fill with marbles and 2 exercise  balls/ pour out        Other:     Desensitization x  Better tolerance - beans and rice bins- removing marbles with vision occluded. , tactile sensory ex,  Fluidotherapy. Rice bin   Work simplification- discussed possible modifications  - cutting vinyl trimming  - using a broom  - applying cardboard sleeve and staple     Handwriting/ functional tasks        - red tubing provided to help to ease prehension needed for writing.   - discussed importance of writing tool size  - folding towels encouraging pinching with R  Hand - 5 towels. Assessment/Comments: Pt is making Good+ progress toward stated plan of care. Pt. Tolerated all exercises and stretches today. Pt is tolerating more resistive pinching tasks usingchis R thumb.    Awaiting C-9 extension to continue therapy- talked to rep- and extension should not be a problem so continuing with the next 2 sessions.          -Rehab Potential: Good  -Requires OT Follow Up: Yes    Time In:  1400          Time Out: 1500           Visit #: 16    Treatment Charges: Mins Time in - Time out  Units   Modalities: FL 10 6026-4119 1   Ther Exercise 15 2299-0139 1   Manual Therapy 15 4513-4241 1   Thera Activities 20 8245-6803 1   ADL/Home Mgt       Neuro Re-education      Gait Training      Group Therapy      Non-Billable Service Time      Other      Total Time/Units 60  4         -Response to Treatment: Pt is happy with his progress. He is back to work on light duty and reports his thumb has been bothering him more but the hypersensitivity is gradually getting less. . Will monitor. Goals: Goals for pt can be seen on initial eval occurring on 1-19-22 and re assessment on 2/21/22. Plan:   [x]  Continue Plan of care: Continue to treat pt 2 - 3 x's a week to work on the above mentioned goals for 6 weeks. Requesting a new C -90 and date extension on this  C-9. [place] Continue ROM, edema control, desensitization and FM tasks. Advance resistive strengthening as tolerated. Treatment covered based on POC and graduated to patient's progress. Pt education continues at each visit to obtain maximum benefits from skilled OT intervention.   []  [facility] of care:   []  Discharge:      Brii Velez, OT/L, [id number]

## 2022-03-02 ENCOUNTER — TREATMENT (OUTPATIENT)
Dept: OCCUPATIONAL THERAPY | Age: 27
End: 2022-03-02
Payer: COMMERCIAL

## 2022-03-02 DIAGNOSIS — S68.511A COMPLETE TRAUMATIC AMPUTATION OF RIGHT THUMB THROUGH PHALANX, INITIAL ENCOUNTER: Primary | ICD-10-CM

## 2022-03-02 PROCEDURE — 97110 THERAPEUTIC EXERCISES: CPT | Performed by: OCCUPATIONAL THERAPIST

## 2022-03-02 PROCEDURE — 97022 WHIRLPOOL THERAPY: CPT | Performed by: OCCUPATIONAL THERAPIST

## 2022-03-02 PROCEDURE — 97530 THERAPEUTIC ACTIVITIES: CPT | Performed by: OCCUPATIONAL THERAPIST

## 2022-03-02 PROCEDURE — 97140 MANUAL THERAPY 1/> REGIONS: CPT | Performed by: OCCUPATIONAL THERAPIST

## 2022-03-02 NOTE — PROGRESS NOTES
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 Edema control, modalities prn  Frequency 3x week for 6 weeks      Assessment of current deficits   []? Functional mobility             [x]? ADLs          [x]? Strength                  []? Cognition   []? Functional transfers           [x]? IADLs         []? Safety Awareness  []? Endurance   [x]? Fine Motor Coordination    []? Balance      []? Vision/perception   [x]? Sensation     []? Gross Motor Coordination [x]? ROM           [x]? Pain                        [x]? Edema          [x]? Scar Adhesion/Skin Integrity      OT PLAN OF CARE   OT POC based on physician orders, patient diagnosis and results of clinical assessment     Frequency/Duration: 2-3x/ week x 6 weeks  Specific OT Treatment to include     [x]? Instruction in HEP                   Modalities:  [x]?  Therapeutic Exercise                 [x]? Ultrasound               []? Electrical Stimulation/Attended  [x]? PROM/Stretching                    [x]? Fluidotherapy          [x]?   Paraffin                   [x]? AAROM  [x]?  AROM                 []? Iontophoresis:   []? Tendon Cali Kinnier  []? Neuromuscular Re-Ed            []? ADL/IADL re-training    [x]?  Therapeutic Activity                  [x]? Pain Management with/without modalities PRN                 [x]?  Manual Therapy                      []? Splinting                                   [x]? Scar Management                   []?Joint Protection/Training  []? Ergonomics                             []? Joint Mobilization                      []? Adaptive Equipment Assessment/Training                             [x]?  Manual Edema Mobilization   []? Myofascial Release                 []? Energy Conservation/Work Simplification  [x]? GM/FM Coordination                []? Safety retraining/education per  individual diagnosis/goals  [x]? Desensitization        Patient Specific Goal: \" To get better and get back to work. \"                             GOALS (Long term same as Short term): updated 2-21-22  1) Patient will demonstrate good understanding of home program (exercises/activities/diagnosis/prognosis/goals) with good accuracy. Goal in progress. 2) Patient will demonstrate increased active/passive range of motion of their R thumb/ wrist to [facility] for ADL/IADL completion. Goal making good gains. 3) Patient will demonstrate  /pinch strength of at least 50# / 5 pinch pounds of their R hand. Goal recently initiated. Jerry Carolina 4) Patient to report decreased pain in their affected r distal upper extremity from 4-6/10 with light movement  to 3/10 or less with resistive functional use. Goal making fair progress. 5) Increase in fine motor function as evidenced by <25 sec to complete 9-hole peg test with the right hand. Goal making fair progress. 6) Patient to report a decrease in hypersensitivity from 90% to 20% or less in their R thumb. Goal making fair progress. Hypersensitivity decreased to 70% of the time. 7) Patient to demonstrate decreased guarding of their affected extremity from 75% to 20% or less. Goal making fair gains. Pt is guarding about 50% of the time. 8) Patient will be knowledgeable of edema control techniques as evident with decreases from moderate to mild/none. Goal making good gains. 9) Patient will demonstrate a non-tender/non-adherent scar. Goal making good gains. 10) Patient will report ADL/ IADL functions as I using the right thumb/ hand. Goal making fair gains. 11) Patient will decrease QuickDASH score to 30% or less for increased participation in daily functional activities. Goal making slow gains.      Pain Level: 2 - 6 on scale of 1-10, sore, tingling/ numb, hypersensitive, tight in the R thumb. Subjective: Pt stated he had difficulty cleaning equipment at work today. Objective:  Updated POC to be completed by 3-21-22. INTERVENTION: COMPLETED: SPECIFICS/COMMENTS:   Modality:     Fluidotherapy R hand with AROM- low heat x  10 min for desensitization and to decrease joint stiffness        AROM:     Wrist AROM x - AROM all planes   Thumb AROM X            x    x     - thumb AROM ( extension, ABD, MP flexion)  - opposition ex- using marbles - using RF and SF, did 15 with  pieces to RF - too hard to SF  - over extend upon release.   - Dig into bag for discs this date for desensitization also. - xsmall screw/ washer bolt assembly/ 2x on and off   -towel task with thumb- 5 reps   -Exercise balls CLW   Coordination  ex     FM tasks X    x             -gentle opposition ex with Bunchems balls 10 pieces   - bunchems hold in hand and place 1 at a time  - [employer] pegboard assembly-   -tweezers -  buttons - 10 lateral pinch but not on his sore spot and kristopher 3 for 3 pt pinch.            PROM/Stretching:     Thumb PROM X   ABD/ADD, ext, MP flexion , opposition to the small finger     Palmar WB with thumb in radial ext x Gentle stretches   Scar Mass/Edema Control:     Retrograde massage X    x     - R radial hand/ palm- beginning and end of session  -x lg gel finger sleeve - using in the evenings          Strengthening:     Hand strengthening   x    x      x    X             - gentle gripping/ xsoft putty  - velcro board - small lateral pinch - 2x, pinch and pull of tabs  - lateral pinch - remove beads ( small washers) from xsoft putty x's 5 pieces. Hand gripper- rung 4 2-20  - julia bar 15# green^- 10x twist in/ out and bending  - red  clothespins --  pom poms   2 sets of 15 lateral and/  15  3 pt pinch   -PVC pipe building activity   - Cup fill with marbles and 2 exercise  balls/ pour out        Other:     Desensitization  - beans and rice bins- removing marbles with vision occluded. , tactile sensory ex,  Fluidotherapy. Rice bin   Work simplification- discussed possible modifications  - cutting vinyl trimming  - using a broom  - applying cardboard sleeve and staple     Handwriting/ functional tasks        - red tubing provided to help to ease prehension needed for writing.   - discussed importance of writing tool size  - folding towels encouraging pinching with R  Hand - 5 towels. Assessment/Comments: Pt is making Good+ progress toward stated plan of care. Pt. Tolerated all exercises and stretches today. Additional visits have been recommended. The main issues at this point are desensitization and intolerance for resistive pressure to the thumb. Pt is awaiting information regarding a thumb prosthetic.  Will hold therapy pending new C9 for OT.           -Rehab Potential: Good  -Requires OT Follow Up: Yes    Time In:  1500          Time Out: 1600           Visit #: 18    Treatment Charges: Mins Time in - Time out  Units   Modalities: FL 10 1769-5096 1   Ther Exercise 15 9226-9816 1   Manual Therapy 15 9241-7742 1   Thera Activities 20 1540- 1600 1   ADL/Home Mgt       Neuro Re-education      Gait Training      Group Therapy Non-Billable Service Time      Other      Total Time/Units 60  4         -Response to Treatment: Pt is happy with his progress. He is back to work on light duty and reports his thumb continues to  bother him more and feel awkward. In addition,. He reports dropping things a lot and striking the thumb tip due to decreased proprioceptive sense. Will monitor. Goals: Goals for pt can be seen on initial eval occurring on 1-19-22 and re assessment on 2/21/22. Plan:   [x]  Continue Plan of care: Continue to treat pt 2 - 3 x's a week to work on the above mentioned goals for 6 weeks. Requested a new C -9. Nikki Sarah Continue ROM, edema control, desensitization and FM tasks. Advance resistive strengthening as tolerated. Treatment covered based on POC and graduated to patient's progress. Pt education continues at each visit to obtain maximum benefits from skilled OT intervention.   []  [facility] of care:   []  Discharge:      Mata Melendez, OT/L, [id number]

## 2022-03-14 DIAGNOSIS — S68.011A AMPUTATION OF RIGHT THUMB, INITIAL ENCOUNTER: Primary | ICD-10-CM

## 2022-03-21 ENCOUNTER — TREATMENT (OUTPATIENT)
Dept: OCCUPATIONAL THERAPY | Age: 27
End: 2022-03-21
Payer: COMMERCIAL

## 2022-03-21 DIAGNOSIS — S68.511A COMPLETE TRAUMATIC AMPUTATION OF RIGHT THUMB THROUGH PHALANX, INITIAL ENCOUNTER: Primary | ICD-10-CM

## 2022-03-21 PROCEDURE — 97140 MANUAL THERAPY 1/> REGIONS: CPT | Performed by: OCCUPATIONAL THERAPY ASSISTANT

## 2022-03-21 PROCEDURE — 97530 THERAPEUTIC ACTIVITIES: CPT | Performed by: OCCUPATIONAL THERAPY ASSISTANT

## 2022-03-21 PROCEDURE — 97110 THERAPEUTIC EXERCISES: CPT | Performed by: OCCUPATIONAL THERAPY ASSISTANT

## 2022-03-21 PROCEDURE — 97022 WHIRLPOOL THERAPY: CPT | Performed by: OCCUPATIONAL THERAPY ASSISTANT

## 2022-03-21 NOTE — PROGRESS NOTES
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 Edema control, modalities prn  Frequency 3x week for 6 weeks      Assessment of current deficits   []? Functional mobility             [x]? ADLs          [x]? Strength                  []? Cognition   []? Functional transfers           [x]? IADLs         []? Safety Awareness  []? Endurance   [x]? Fine Motor Coordination    []? Balance      []? Vision/perception   [x]? Sensation     []? Gross Motor Coordination [x]? ROM           [x]? Pain                        [x]? Edema          [x]? Scar Adhesion/Skin Integrity      OT PLAN OF CARE   OT POC based on physician orders, patient diagnosis and results of clinical assessment     Frequency/Duration: 2-3x/ week x 6 weeks  Specific OT Treatment to include     [x]? Instruction in HEP                   Modalities:  [x]?  Therapeutic Exercise                 [x]? Ultrasound               []? Electrical Stimulation/Attended  [x]? PROM/Stretching                    [x]? Fluidotherapy          [x]?   Paraffin                   [x]? AAROM  [x]?  AROM                 []? Iontophoresis:   []? Tendon Juana Gains  []? Neuromuscular Re-Ed            []? ADL/IADL re-training    [x]?  Therapeutic Activity                  [x]? Pain Management with/without modalities PRN                 [x]?  Manual Therapy                      []? Splinting                                   [x]? Scar Management                   []?Joint Protection/Training  []? Ergonomics                             []? Joint Mobilization                      []? Adaptive Equipment Assessment/Training                             [x]?  Manual Edema Mobilization   []? Myofascial Release                 []? Energy Conservation/Work Simplification  [x]? GM/FM Coordination                []? Safety retraining/education per  individual diagnosis/goals  [x]? Desensitization        Patient Specific Goal: \" To get better and get back to work. \"                             GOALS (Long term same as Short term): updated 2-21-22  1) Patient will demonstrate good understanding of home program (exercises/activities/diagnosis/prognosis/goals) with good accuracy. Goal in progress. 2) Patient will demonstrate increased active/passive range of motion of their R thumb/ wrist to [facility] for ADL/IADL completion. Goal making good gains. 3) Patient will demonstrate  /pinch strength of at least 50# / 5 pinch pounds of their R hand. Goal recently initiated. Christie Jackson 4) Patient to report decreased pain in their affected r distal upper extremity from 4-6/10 with light movement  to 3/10 or less with resistive functional use. Goal making fair progress. 5) Increase in fine motor function as evidenced by <25 sec to complete 9-hole peg test with the right hand. Goal making fair progress. 6) Patient to report a decrease in hypersensitivity from 90% to 20% or less in their R thumb. Goal making fair progress. Hypersensitivity decreased to 70% of the time. 7) Patient to demonstrate decreased guarding of their affected extremity from 75% to 20% or less. Goal making fair gains. Pt is guarding about 50% of the time. 8) Patient will be knowledgeable of edema control techniques as evident with decreases from moderate to mild/none. Goal making good gains. 9) Patient will demonstrate a non-tender/non-adherent scar. Goal making good gains. 10) Patient will report ADL/ IADL functions as I using the right thumb/ hand. Goal making fair gains. 11) Patient will decrease QuickDASH score to 30% or less for increased participation in daily functional activities. Goal making slow gains.      Pain Level: 2 - 6 on scale of 1-10, sore, tingling/ numb, hypersensitive, tight in the R thumb. Subjective: Pt stated \"I still can't put pressure on that one part of my thumb. \"     Objective:  Updated POC to be completed by 3-21-22. INTERVENTION: COMPLETED: SPECIFICS/COMMENTS:   Modality:     Fluidotherapy R hand with AROM- low heat x  10 min for desensitization and to decrease joint stiffness        AROM:     Wrist AROM x - AROM all planes   Thumb AROM X            x    x  x   - thumb AROM ( extension, ABD, MP flexion)  - opposition ex- using marbles - using RF and SF, did 15 with  pieces to RF - too hard to SF  - over extend upon release.   - Dig into bag for discs this date for desensitization also. - xsmall screw/ washer bolt assembly/ 2x on and off   -towel task with thumb- 5 reps   -Exercise balls CLW   Coordination  ex     FM tasks                 x -gentle opposition ex with Bunchems balls 10 pieces   - bunchems hold in hand and place 1 at a time  - 3DMGAME assembly-   -tweezers -  buttons - 10 lateral pinch but not on his sore spot and kristopher 3 for 3 pt pinch.      -opposition with small beads        PROM/Stretching:     Thumb PROM X   ABD/ADD, ext, MP flexion , opposition to the small finger     Palmar WB with thumb in radial ext x Gentle stretches   Scar Mass/Edema Control:     Retrograde massage X    x     - R radial hand/ palm- beginning and end of session  -x lg gel finger sleeve - using in the evenings          Strengthening:     Hand strengthening       x    x  x    X          x   - gentle gripping/ xsoft putty  - velcro board - small lateral pinch - 2x, pinch and pull of tabs  - lateral pinch - remove beads ( small washers) from xsoft putty x's 5 pieces. Hand gripper- rung 4 2-20  - julia bar 15# green^- 10x twist in/ out and bending  - red  clothespins --  pom poms   2 sets of 15 lateral and/  15  3 pt pinch   -PVC pipe building activity   - Cup fill with marbles and 2 exercise  balls/ pour out  -yellow (soft) theraputty- placing discs- 3x         Other:     Desensitization  - beans and rice bins- removing marbles with vision occluded. , tactile sensory ex,  Fluidotherapy. Rice bin   Work simplification- discussed possible modifications  - cutting vinyl trimming  - using a broom  - applying cardboard sleeve and staple     Handwriting/ functional tasks        - red tubing provided to help to ease prehension needed for writing.   - discussed importance of writing tool size  - folding towels encouraging pinching with R  Hand - 5 towels. Assessment/Comments: Pt is making Good+ progress toward stated plan of care. Pt. Tolerated all exercises and stretches today.  Pt. stated he has a zoom call on Friday concerning his prosthetic.              -Rehab Potential: Good  -Requires OT Follow Up: Yes    Time In:  1500          Time Out: 1600           Visit #: 18    Treatment Charges: Mins Time in - Time out  Units   Modalities: FL 10 7777-0069 1   Ther Exercise 15 5093-9766 1   Manual Therapy 15 9487-0971 1   Thera Activities 20 1540- 1600 1   ADL/Home Mgt       Neuro Re-education      Gait Training      Group Therapy      Non-Billable Service Time      Other      Total Time/Units 60  4         -Response to Treatment: Pt is happy with his progress. Pt. Is still frustrated with the pain on medial of thumb. Goals: Goals for pt can be seen on initial eval occurring on 1-19-22 and re assessment on 2/21/22. Plan:   [x]  Continue Plan of care:   Continue ROM, edema control, desensitization and FM tasks. Advance resistive strengthening as tolerated. Treatment covered based on POC and graduated to patient's progress. Pt education continues at each visit to obtain maximum benefits from skilled OT intervention.   []  [facility] of care:   []  Discharge:      Miguel [address], [id number]

## 2022-03-22 ENCOUNTER — OFFICE VISIT (OUTPATIENT)
Dept: ORTHOPEDIC SURGERY | Age: 27
End: 2022-03-22
Payer: COMMERCIAL

## 2022-03-22 VITALS — WEIGHT: 240 LBS | BODY MASS INDEX: 37.67 KG/M2 | HEIGHT: 67 IN

## 2022-03-22 DIAGNOSIS — S68.011A AMPUTATION OF RIGHT THUMB, INITIAL ENCOUNTER: Primary | ICD-10-CM

## 2022-03-22 PROCEDURE — 99212 OFFICE O/P EST SF 10 MIN: CPT | Performed by: ORTHOPAEDIC SURGERY

## 2022-03-22 NOTE — PROGRESS NOTES
Chief Complaint   Patient presents with    Follow Up After Procedure     14 weeks out, amputation of right thumb. Ciera Villegas is a 33y.o. year old  who presents for follow up of right thumb revision amputation. Overall he is just over 3 months out from his revision imitation. Doing well in therapy. Reports he is back to work with all activities. He has been seen by  orthotics for a naked thumb prosthetic. Overall he feels that things are improving. His biggest complaint is continued sensitivity of the radial aspect of the amputation site. Past Medical History:   Diagnosis Date    Asthma      Past Surgical History:   Procedure Laterality Date    FINGER AMPUTATION Right 12/15/2021    RIGHT THUMB REVISION  AMPUTATION performed by Sourav Warner MD at [address] [place] [address] Right 12/7/2021    RIGHT THUMB REIMPLANTATION performed by [facility]rav [facility] MD at [facility]       No current outpatient medications on file. No Known Allergies  Social History     Socioeconomic History    Marital status: Single     Spouse name: Not on file    Number of children: Not on file    Years of education: Not on file    Highest education level: Not on file   Occupational History    Not on file   Tobacco Use    Smoking status: Never Smoker    Smokeless tobacco: Never Used   Substance and Sexual Activity    Alcohol use: No    Drug use: No    Sexual activity: Not on file   Other Topics Concern    Not on file   Social History Narrative    Not on file     Social Determinants of Health     Financial Resource Strain:     Difficulty of Paying Living Expenses: Not on file   Food Insecurity:     Worried About Running Out of Food in the Last Year: Not on file    Aminata of Food in the Last Year: Not on file   Transportation Needs:     Lack of Transportation (Medical): Not on file    Lack of Transportation (Non-Medical):  Not on file   Physical Activity:     Days of Exercise per Week: Not on file    Minutes of Exercise per Session: Not on file   Stress:     Feeling of Stress : Not on file   Social Connections:     Frequency of Communication with Friends and Family: Not on file    Frequency of Social Gatherings with Friends and Family: Not on file    Attends Baptism Services: Not on file    Active Member of Clubs or Organizations: Not on file    Attends Club or Organization Meetings: Not on file    Marital Status: Not on file   Intimate Partner Violence:     Fear of Current or Ex-Partner: Not on file    Emotionally Abused: Not on file    Physically Abused: Not on file    Sexually Abused: Not on file   Housing Stability:     Unable to Pay for Housing in the Last Year: Not on file    Number of [place] in the Last Year: Not on file    Unstable Housing in the Last Year: Not on file     History reviewed. No pertinent family history. Skin: (-) rash,(-) psoriasis,(-) eczema, (-)skin cancer. Musculoskeletal: (-) fractures,  (-) dislocations,(-) collagen vascular disease, (-) fibromyalgia, (-) multiple sclerosis, (-) muscular dystrophy, (-) RSD,(-) joint pain (-)swelling, (-) joint pain,swelling. Neurologic: (-) epilepsy, (-)seizures,(-) brain tumor,(-) TIA, (-)stroke, (-)headaches, (-)Parkinson disease,(-) memory loss, (-) LOC. Cardiovascular: (-) Chest pain, (-) swelling in legs/feet, (-) SOB, (-) cramping in legs/feet with walking. SUBJECTIVE:      Constitutional:    The patient is alert and oriented x 3, appears to be stated age and in no distress. Right upper extremity: Nontender shoulder elbow and wrist.  Amputation site well-healed. There is a palpable fluctuance over the radial aspect of the thumb amputation site consistent with radial digital nerve neuroma. He otherwise has excellent range of motion of thumb and is grossly neurovascular intact. Impression: Just over 3 months out thumb amputation      Plan:     Overall he is doing quite well.   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 This may or may not need further intervention. However he is quite happy with his progress. He is being fitted with a prosthetic. In addition he is back to regular work activities. Therefore placed at [address] [place] [address] at today's visit. He may follow-up at his discretion.

## 2022-03-23 ENCOUNTER — TREATMENT (OUTPATIENT)
Dept: OCCUPATIONAL THERAPY | Age: 27
End: 2022-03-23
Payer: COMMERCIAL

## 2022-03-23 DIAGNOSIS — S68.511A COMPLETE TRAUMATIC AMPUTATION OF RIGHT THUMB THROUGH PHALANX, INITIAL ENCOUNTER: Primary | ICD-10-CM

## 2022-03-23 PROCEDURE — 97022 WHIRLPOOL THERAPY: CPT | Performed by: OCCUPATIONAL THERAPIST

## 2022-03-23 PROCEDURE — 97110 THERAPEUTIC EXERCISES: CPT | Performed by: OCCUPATIONAL THERAPIST

## 2022-03-23 PROCEDURE — 97140 MANUAL THERAPY 1/> REGIONS: CPT | Performed by: OCCUPATIONAL THERAPIST

## 2022-03-23 PROCEDURE — 97530 THERAPEUTIC ACTIVITIES: CPT | Performed by: OCCUPATIONAL THERAPIST

## 2022-03-23 NOTE — PROGRESS NOTES
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 Edema control, modalities prn  Frequency 3x week for 6 weeks      Assessment of current deficits   []? Functional mobility             [x]? ADLs          [x]? Strength                  []? Cognition   []? Functional transfers           [x]? IADLs         []? Safety Awareness  []? Endurance   [x]? Fine Motor Coordination    []? Balance      []? Vision/perception   [x]? Sensation     []? Gross Motor Coordination [x]? ROM           [x]? Pain                        [x]? Edema          [x]? Scar Adhesion/Skin Integrity      OT PLAN OF CARE   OT POC based on physician orders, patient diagnosis and results of clinical assessment     Frequency/Duration: 2-3x/ week x 6 weeks  Specific OT Treatment to include     [x]? Instruction in HEP                   Modalities:  [x]?  Therapeutic Exercise                 [x]? Ultrasound               []? Electrical Stimulation/Attended  [x]? PROM/Stretching                    [x]? Fluidotherapy          [x]?   Paraffin                   [x]? AAROM  [x]?  AROM                 []? Iontophoresis:   []? Tendon Susan Blocker  []? Neuromuscular Re-Ed            []? ADL/IADL re-training    [x]?  Therapeutic Activity                  [x]? Pain Management with/without modalities PRN                 [x]?  Manual Therapy                      []? Splinting                                   [x]? Scar Management                   []?Joint Protection/Training  []? Ergonomics                             []? Joint Mobilization                      []? Adaptive Equipment Assessment/Training                             [x]?  Manual Edema Mobilization   []? Myofascial Release                 []? Energy Conservation/Work Simplification  [x]? GM/FM Coordination                []? Safety retraining/education per  individual diagnosis/goals  [x]? Desensitization        Patient Specific Goal: \" To get better and get back to work. \"                             GOALS (Long term same as Short term): updated 2-21-22  1) Patient will demonstrate good understanding of home program (exercises/activities/diagnosis/prognosis/goals) with good accuracy. Goal in progress. 2) Patient will demonstrate increased active/passive range of motion of their R thumb/ wrist to [facility] for ADL/IADL completion. Goal making good gains. 3) Patient will demonstrate  /pinch strength of at least 50# / 5 pinch pounds of their R hand. Goal recently initiated. Silver Orozco 4) Patient to report decreased pain in their affected r distal upper extremity from 4-6/10 with light movement  to 3/10 or less with resistive functional use. Goal making fair progress. 5) Increase in fine motor function as evidenced by <25 sec to complete 9-hole peg test with the right hand. Goal making fair progress.    6) Patient to report a decrease in hypersensitivity from 90% to 20% or less in their R thumb. Goal making fair progress. Hypersensitivity decreased to 70% of the time. 7) Patient to demonstrate decreased guarding of their affected extremity from 75% to 20% or less. Goal making fair gains. Pt is guarding about 50% of the time. 8) Patient will be knowledgeable of edema control techniques as evident with decreases from moderate to mild/none. Goal making good gains. 9) Patient will demonstrate a non-tender/non-adherent scar. Goal making good gains. 10) Patient will report ADL/ IADL functions as I using the right thumb/ hand. Goal making fair gains. 11) Patient will decrease QuickDASH score to 30% or less for increased participation in daily functional activities. Goal making slow gains.      Pain Level: 2-3 on scale of 1-10, radiating sharp pains in the R thumb following work/ no pain at Tx start    Subjective: \" I am back to doing almost everything at work. \"    Objective:  Updated POC to be completed by 4-21-22. INTERVENTION: COMPLETED: SPECIFICS/COMMENTS:   Modality:     Fluidotherapy R hand with AROM- low heat x  10 min for desensitization and to decrease joint stiffness        AROM:     Wrist AROM x - AROM all planes   Thumb AROM X            x    x  x   - thumb AROM ( extension, ABD, MP flexion)  - opposition ex- using marbles - using RF and SF, did 15 with  pieces to RF - too hard to SF  - over extend upon release.   - Dig into bag for discs this date for desensitization also. - xsmall screw/ washer bolt assembly/ ^5x on and off   -towel task with thumb- 5 reps   -Exercise balls CLW   Coordination  ex     FM tasks     x    x         -gentle opposition ex with Bunchems balls 10 pieces   - bunchems hold in hand and place 1 at a time in in hand translation  - ColorChipe pegboard assembly- in manual/ out with tweezers  -tweezers -  buttons - 10 lateral pinch but not on his sore spot and kristopher 3 for 3 pt pinch.      -opposition with small beads PROM/Stretching:     Thumb PROM X   ABD/ADD, ext, MP flexion , opposition to the small finger     Palmar WB with thumb in radial ext x Gentle stretches   Scar Mass/Edema Control:     Retrograde massage X    x     - R radial hand/ palm- beginning and end of session  -x lg gel finger sleeve - using in the evenings          Strengthening:     Hand strengthening   x                   - gentle gripping/ xsoft putty  - velcro board - small lateral pinch - 2x, pinch and pull of tabs  - lateral pinch - remove beads ( small washers) from xsoft putty x's 5 pieces. Hand gripper- rung 4 2-20  - julia bar 15# green^- 10x twist in/ out and bending  - red  clothespins --  pom poms   2 sets of 15 lateral and/  15  3 pt pinch   - yellow (soft) theraputty- placing discs- 3x         Other:     Desensitization  - beans and rice bins- removing marbles with vision occluded. , tactile sensory ex,  Fluidotherapy. Rice bin   Work simplification- discussed possible modifications x - cutting vinyl trimming  - using a broom  - applying cardboard sleeve and staple     Handwriting/ functional tasks        - red tubing provided to help to ease prehension needed for writing.   - discussed importance of writing tool size  - folding towels encouraging pinching with R  Hand - 5 towels. Assessment/Comments: Pt is making Good+ progress toward stated plan of care. Hand strengthening and FM tasks continues today. Sensitivity in one localized portion of the thumb tip continues. However, generalized sensitivity is getting better.  Will continue.          -Rehab Potential: Good  -Requires OT Follow Up: Yes    Time In:  1500          Time Out: 1600           Visit #: 2    Treatment Charges: Mins Time in - Time out  Units   Modalities: FL 10 8510-6042 1   Ther Exercise 15 3499-0486 1   Manual Therapy 15 7141-6921 1   Thera Activities 20 1540- 1600 1   ADL/Home Mgt       Neuro Re-education      Gait Training      Group Therapy      Non-Billable Service Time      Other      Total Time/Units 60  4         -Response to Treatment: Pt is happy with his progress. Pt states he is back to doing his normal activities at work. He reports the thumb dies swell and ache at the end of his work day. Will monitor. Goals: Goals for pt can be seen on initial eval occurring on 1-19-22 and re assessment on 2/21/22. Hold re-evaluation until 4-21-22 due to break in Tx due to C9. Plan:   [x]  Continue Plan of care:   Continue ROM, edema control, desensitization, FM tasks, and resistive strengthening as tolerated. Treatment covered based on POC and graduated to patient's progress. Pt education continues at each visit to obtain maximum benefits from skilled OT intervention.   []  [address] of care:   []  Discharge:      Cleatus Snellen, OT/L, [id number]

## 2022-03-25 ENCOUNTER — TREATMENT (OUTPATIENT)
Dept: OCCUPATIONAL THERAPY | Age: 27
End: 2022-03-25
Payer: COMMERCIAL

## 2022-03-25 DIAGNOSIS — S68.511A COMPLETE TRAUMATIC AMPUTATION OF RIGHT THUMB THROUGH PHALANX, INITIAL ENCOUNTER: Primary | ICD-10-CM

## 2022-03-25 PROCEDURE — 97022 WHIRLPOOL THERAPY: CPT | Performed by: OCCUPATIONAL THERAPY ASSISTANT

## 2022-03-25 PROCEDURE — 97530 THERAPEUTIC ACTIVITIES: CPT | Performed by: OCCUPATIONAL THERAPY ASSISTANT

## 2022-03-25 PROCEDURE — 97110 THERAPEUTIC EXERCISES: CPT | Performed by: OCCUPATIONAL THERAPY ASSISTANT

## 2022-03-25 PROCEDURE — 97140 MANUAL THERAPY 1/> REGIONS: CPT | Performed by: OCCUPATIONAL THERAPY ASSISTANT

## 2022-03-25 NOTE — PROGRESS NOTES
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         S68.011A (ICD-10-CM) - Amputation of right thumb with complication, initial encounter                                          Date of Surgery/Injury: R Amputation and replantation (21)  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 Edema control, modalities prn  Frequency 3x week for 6 weeks      Assessment of current deficits   []? Functional mobility             [x]? ADLs          [x]? Strength                  []? Cognition   []? Functional transfers           [x]? IADLs         []? Safety Awareness  []? Endurance   [x]? Fine Motor Coordination    []? Balance      []? Vision/perception   [x]? Sensation     []? Gross Motor Coordination [x]? ROM           [x]? Pain                        [x]? Edema          [x]? Scar Adhesion/Skin Integrity      OT PLAN OF CARE   OT POC based on physician orders, patient diagnosis and results of clinical assessment     Frequency/Duration: 2-3x/ week x 6 weeks  Specific OT Treatment to include     [x]? Instruction in HEP                   Modalities:  [x]?  Therapeutic Exercise                 [x]? Ultrasound               []? Electrical Stimulation/Attended  [x]? PROM/Stretching                    [x]? Fluidotherapy          [x]?   Paraffin                   [x]? AAROM  [x]?  AROM                 []? Iontophoresis:   []? Tendon Vincent Colon  []? Neuromuscular Re-Ed            []? ADL/IADL re-training    [x]?  Therapeutic Activity                  [x]? Pain Management with/without modalities PRN                 [x]?  Manual Therapy                      []? Splinting                                   [x]? Scar Management                   []?Joint Protection/Training  []? Ergonomics                             []? Joint Mobilization                      []? Adaptive Equipment Assessment/Training                             [x]?  Manual Edema Mobilization   []? Myofascial Release                 []? Energy Conservation/Work Simplification  [x]? GM/FM Coordination                []? Safety retraining/education per  individual diagnosis/goals  [x]? Desensitization        Patient Specific Goal: \" To get better and get back to work. \"                             GOALS (Long term same as Short term): updated 2-21-22  1) Patient will demonstrate good understanding of home program (exercises/activities/diagnosis/prognosis/goals) with good accuracy. Goal in progress. 2) Patient will demonstrate increased active/passive range of motion of their R thumb/ wrist to [facility] for ADL/IADL completion. Goal making good gains. 3) Patient will demonstrate  /pinch strength of at least 50# / 5 pinch pounds of their R hand. Goal recently initiated. Tee Chapin 4) Patient to report decreased pain in their affected r distal upper extremity from 4-6/10 with light movement  to 3/10 or less with resistive functional use. Goal making fair progress. 5) Increase in fine motor function as evidenced by <25 sec to complete 9-hole peg test with the right hand. Goal making fair progress.    6) Patient to report a decrease in hypersensitivity from 90% to 20% or less in their R thumb. Goal making fair progress. Hypersensitivity decreased to 70% of the time. 7) Patient to demonstrate decreased guarding of their affected extremity from 75% to 20% or less. Goal making fair gains. Pt is guarding about 50% of the time. 8) Patient will be knowledgeable of edema control techniques as evident with decreases from moderate to mild/none. Goal making good gains. 9) Patient will demonstrate a non-tender/non-adherent scar. Goal making good gains. 10) Patient will report ADL/ IADL functions as I using the right thumb/ hand. Goal making fair gains. 11) Patient will decrease QuickDASH score to 30% or less for increased participation in daily functional activities. Goal making slow gains.      Pain Level: 1 on scale of 1-10, radiating sharp pains in the R thumb following work/ no pain at Tx start    Subjective: \"I have the zoom call with the prosthetics place today\"     Objective:  Updated POC to be completed by 4-21-22. INTERVENTION: COMPLETED: SPECIFICS/COMMENTS:   Modality:     Fluidotherapy R hand with AROM- low heat x  10 min for desensitization and to decrease joint stiffness        AROM:     Wrist AROM x - AROM all planes   Thumb AROM X  x          x    x  x   - thumb AROM ( extension, ABD, MP flexion)  - opposition ex- using marbles - using RF and SF, did 15 with  pieces to RF - too hard to SF  - over extend upon release.   - Dig into bag for discs this date for desensitization also. - xsmall screw/ washer bolt assembly/ ^5x on and off   -towel task with thumb- 5 reps   -Exercise balls CLW   Coordination  ex     FM tasks     x              x -gentle opposition ex with Bunchems balls 10 pieces   - bunchems hold in hand and place 1 at a time in in hand translation  - [employer] pegboard assembly- in manual/ out with tweezers  -tweezers -  buttons - 10 lateral pinch but not on his sore spot and kristopher 3 for 3 pt pinch.      -opposition with small beads and thread on string        PROM/Stretching:     Thumb PROM X   ABD/ADD, ext, MP flexion , opposition to the small finger     Palmar WB with thumb in radial ext x Gentle stretches   Scar Mass/Edema Control:     Retrograde massage X    x     - R radial hand/ palm- beginning and end of session  -x lg gel finger sleeve - using in the evenings          Strengthening:     Hand strengthening                      - gentle gripping/ xsoft putty  - velcro board - small lateral pinch - 2x, pinch and pull of tabs  - lateral pinch - remove beads ( small washers) from xsoft putty x's 5 pieces. Hand gripper- rung 4 2-20  - julia bar 15# green^- 10x twist in/ out and bending  - red  clothespins --  pom poms   2 sets of 15 lateral and/  15  3 pt pinch   - yellow (soft) theraputty- placing discs- 3x         Other:     Desensitization  - beans and rice bins- removing marbles with vision occluded. , tactile sensory ex,  Fluidotherapy. Rice bin   Work simplification- discussed possible modifications x - cutting vinyl trimming  - using a broom  - applying cardboard sleeve and staple     Handwriting/ functional tasks        - red tubing provided to help to ease prehension needed for writing.   - discussed importance of writing tool size  - folding towels encouraging pinching with R  Hand - 5 towels. Assessment/Comments: Pt is making Good+ progress toward stated plan of care. Pt. Tolerated all exercises and ax's today. Pt. Had some increased pain with thumb flexion during AROM ex's. Held resistive exercises today to minimize increasing thumb pain. Will continue to advance as tolerated.             -Rehab Potential: Good  -Requires OT Follow Up: Yes    Time In:  1305         Time Out: 1400           Visit #:3    Treatment Charges: Mins Time in - Time out  Units   Modalities: FL 10 3789-6971 1   Ther Exercise 15 4693-9020 1   Manual Therapy 10 9071-4006 1   Thera Activities 20 7837-4518 1   ADL/Home Mgt       Neuro Re-education      Gait Training      Group Therapy      Non-Billable Service Time      Other      Total Time/Units 55  4         -Response to Treatment: Pt is happy with his progress. Pt states he is back to doing his normal activities at work. He reports the thumb dies swell and ache at the end of his work day. Will monitor. Goals: Goals for pt can be seen on initial eval occurring on 1-19-22 and re assessment on 2/21/22. Hold re-evaluation until 4-21-22 due to break in Tx due to C9. Plan:   [x]  Continue Plan of care:   Continue ROM, edema control, desensitization, FM tasks, and resistive strengthening as tolerated. Treatment covered based on POC and graduated to patient's progress. Pt education continues at each visit to obtain maximum benefits from skilled OT intervention.   []  [address] [facility] of care:   []  Discharge:      Miguel Angel [address], [id number]

## 2022-03-28 ENCOUNTER — TREATMENT (OUTPATIENT)
Dept: OCCUPATIONAL THERAPY | Age: 27
End: 2022-03-28
Payer: COMMERCIAL

## 2022-03-28 DIAGNOSIS — S68.511A COMPLETE TRAUMATIC AMPUTATION OF RIGHT THUMB THROUGH PHALANX, INITIAL ENCOUNTER: Primary | ICD-10-CM

## 2022-03-28 PROCEDURE — 97140 MANUAL THERAPY 1/> REGIONS: CPT | Performed by: OCCUPATIONAL THERAPY ASSISTANT

## 2022-03-28 PROCEDURE — 97110 THERAPEUTIC EXERCISES: CPT | Performed by: OCCUPATIONAL THERAPY ASSISTANT

## 2022-03-28 PROCEDURE — 97530 THERAPEUTIC ACTIVITIES: CPT | Performed by: OCCUPATIONAL THERAPY ASSISTANT

## 2022-03-28 PROCEDURE — 97022 WHIRLPOOL THERAPY: CPT | Performed by: OCCUPATIONAL THERAPY ASSISTANT

## 2022-03-28 NOTE — PROGRESS NOTES
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 Edema control, modalities prn  Frequency 3x week for 6 weeks      Assessment of current deficits   []? Functional mobility             [x]? ADLs          [x]? Strength                  []? Cognition   []? Functional transfers           [x]? IADLs         []? Safety Awareness  []? Endurance   [x]? Fine Motor Coordination    []? Balance      []? Vision/perception   [x]? Sensation     []? Gross Motor Coordination [x]? ROM           [x]? Pain                        [x]? Edema          [x]? Scar Adhesion/Skin Integrity      OT PLAN OF CARE   OT POC based on physician orders, patient diagnosis and results of clinical assessment     Frequency/Duration: 2-3x/ week x 6 weeks  Specific OT Treatment to include     [x]? Instruction in HEP                   Modalities:  [x]?  Therapeutic Exercise                 [x]? Ultrasound               []? Electrical Stimulation/Attended  [x]? PROM/Stretching                    [x]? Fluidotherapy          [x]?   Paraffin                   [x]? AAROM  [x]?  AROM                 []? Iontophoresis:   []? Tendon Rhonda Lopes  []? Neuromuscular Re-Ed            []? ADL/IADL re-training    [x]?  Therapeutic Activity                  [x]? Pain Management with/without modalities PRN                 [x]?  Manual Therapy                      []? Splinting                                   [x]? Scar Management                   []?Joint Protection/Training  []? Ergonomics                             []? Joint Mobilization                      []? Adaptive Equipment Assessment/Training                             [x]?  Manual Edema Mobilization   []? Myofascial Release                 []? Energy Conservation/Work Simplification  [x]? GM/FM Coordination                []? Safety retraining/education per  individual diagnosis/goals  [x]? Desensitization        Patient Specific Goal: \" To get better and get back to work. \"                             GOALS (Long term same as Short term): updated 2-21-22  1) Patient will demonstrate good understanding of home program (exercises/activities/diagnosis/prognosis/goals) with good accuracy. Goal in progress. 2) Patient will demonstrate increased active/passive range of motion of their R thumb/ wrist to [facility] for ADL/IADL completion. Goal making good gains. 3) Patient will demonstrate  /pinch strength of at least 50# / 5 pinch pounds of their R hand. Goal recently initiated. Rometta Favre 4) Patient to report decreased pain in their affected r distal upper extremity from 4-6/10 with light movement  to 3/10 or less with resistive functional use. Goal making fair progress. 5) Increase in fine motor function as evidenced by <25 sec to complete 9-hole peg test with the right hand. Goal making fair progress.    6) Patient to report a decrease in hypersensitivity from 90% to 20% or less in their R thumb. Goal making fair progress. Hypersensitivity decreased to 70% of the time. 7) Patient to demonstrate decreased guarding of their affected extremity from 75% to 20% or less. Goal making fair gains. Pt is guarding about 50% of the time. 8) Patient will be knowledgeable of edema control techniques as evident with decreases from moderate to mild/none. Goal making good gains. 9) Patient will demonstrate a non-tender/non-adherent scar. Goal making good gains. 10) Patient will report ADL/ IADL functions as I using the right thumb/ hand. Goal making fair gains. 11) Patient will decrease QuickDASH score to 30% or less for increased participation in daily functional activities. Goal making slow gains.      Pain Level: 1 on scale of 1-10, radiating sharp pains in the R thumb following work/ no pain at Tx start    Subjective: \"My wrist is stiff today\"     Objective:  Updated POC to be completed by 4-21-22. INTERVENTION: COMPLETED: SPECIFICS/COMMENTS:   Modality:     Fluidotherapy R hand with AROM- low heat x  10 min for desensitization and to decrease joint stiffness        AROM:     Wrist AROM x - AROM all planes   Thumb AROM X                x  x   - thumb AROM ( extension, ABD, MP flexion)  - opposition ex- using marbles - using RF and SF, did 15 with  pieces to RF - too hard to SF  - over extend upon release.   - Dig into bag for discs this date for desensitization also. - xsmall screw/ washer bolt assembly/ ^5x on and off   -towel task with thumb- 5 reps   -Exercise balls CLW   Coordination  ex     FM tasks     x              x -gentle opposition ex with Bunchems balls 10 pieces   - bunchems hold in hand and place 1 at a time in in hand translation  - [employer] pegboard assembly- in manual/ out with tweezers  -tweezers -  buttons - 10 lateral pinch but not on his sore spot and kristopher 3 for 3 pt pinch.      -opposition with small beads and thread on string PROM/Stretching:     Thumb PROM X   ABD/ADD, ext, MP flexion , opposition to the small finger     Palmar WB with thumb in radial ext  Gentle stretches   Scar Mass/Edema Control:     Retrograde massage X    x     - R radial hand/ palm- beginning and end of session  -x lg gel finger sleeve - using in the evenings          Strengthening:     Hand strengthening   x    x        x        x   - gentle gripping/ xsoft putty  - velcro board - small lateral pinch - 5x, large roller with handle- 5x and  pinch and pull of tabs  - lateral pinch - removal of 7 dimes from soft putty.   -Hand gripper- rung 4 2-20  - julia bar 15# green- 20x^ twist in/ out and bending  - red  clothespins --  pom poms   2 sets of 15 lateral and/  15  3 pt pinch   - yellow (soft) theraputty- placing discs- 3x         Other:     Desensitization  - beans and rice bins- removing marbles with vision occluded. , tactile sensory ex,  Fluidotherapy. Rice bin   Work simplification- discussed possible modifications  - cutting vinyl trimming  - using a broom  - applying cardboard sleeve and staple     Handwriting/ functional tasks        - red tubing provided to help to ease prehension needed for writing.   - discussed importance of writing tool size  - folding towels encouraging pinching with R  Hand - 5 towels. Assessment/Comments: Pt is making Good+ progress toward stated plan of care. Pt. Tolerated all resistive exercises today. Pt's chief complaint was wrist pain . Will monitor pain and advance as tolerated.  Pt. Stated the prosthetic could take up to 3 months to receive.        -Rehab Potential: Good  -Requires OT Follow Up: Yes    Time In:  1505         Time Out: 1600          Visit #:4    Treatment Charges: Mins Time in - Time out  Units   Modalities: FL 10 2786-5892 1   Ther Exercise 15 3876-8468 1   Manual Therapy 10 9144-2338 1   Thera Activities 20 6482-9812 1   ADL/Home Mgt       Neuro Re-education      Gait Training      Group Therapy Non-Billable Service Time      Other      Total Time/Units 55  4         -Response to Treatment: Pt is happy with his progress. Pt states he is back to doing his normal activities at work. He reports the thumb does swell and ache at the end of his work day. Will monitor. Goals: Goals for pt can be seen on initial eval occurring on 1-19-22 and re assessment on 2/21/22. Hold re-evaluation until 4-21-22 due to break in Tx due to C9. Plan:   [x]  Continue Plan of care:   Continue ROM, edema control, desensitization, FM tasks, and resistive strengthening as tolerated. Treatment covered based on POC and graduated to patient's progress. Pt education continues at each visit to obtain maximum benefits from skilled OT intervention.   []  [address] [facility] of care:   []  Discharge:      Miguel Angel [address], [id number]

## 2022-04-01 ENCOUNTER — TREATMENT (OUTPATIENT)
Dept: OCCUPATIONAL THERAPY | Age: 27
End: 2022-04-01
Payer: COMMERCIAL

## 2022-04-01 DIAGNOSIS — S68.511A COMPLETE TRAUMATIC AMPUTATION OF RIGHT THUMB THROUGH PHALANX, INITIAL ENCOUNTER: Primary | ICD-10-CM

## 2022-04-01 PROCEDURE — 97110 THERAPEUTIC EXERCISES: CPT | Performed by: OCCUPATIONAL THERAPIST

## 2022-04-01 PROCEDURE — 97530 THERAPEUTIC ACTIVITIES: CPT | Performed by: OCCUPATIONAL THERAPIST

## 2022-04-01 NOTE — PROGRESS NOTES
[facility] [address]  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 Edema control, modalities prn  Frequency 3x week for 6 weeks      Assessment of current deficits   []? Functional mobility             [x]? ADLs          [x]? Strength                  []? Cognition   []? Functional transfers           [x]? IADLs         []? Safety Awareness  []? Endurance   [x]? Fine Motor Coordination    []? Balance      []? Vision/perception   [x]? Sensation     []? Gross Motor Coordination [x]? ROM           [x]? Pain                        [x]? Edema          [x]? Scar Adhesion/Skin Integrity      OT PLAN OF CARE   OT POC based on physician orders, patient diagnosis and results of clinical assessment     Frequency/Duration: 2-3x/ week x 6 weeks  Specific OT Treatment to include     [x]? Instruction in HEP                   Modalities:  [x]?  Therapeutic Exercise                 [x]? Ultrasound               []? Electrical Stimulation/Attended  [x]? PROM/Stretching                    [x]? Fluidotherapy          [x]?   Paraffin                   [x]? AAROM  [x]?  AROM                 []? Iontophoresis:   []? Tendon Doneta Freeze  []? Neuromuscular Re-Ed            []? ADL/IADL re-training    [x]?  Therapeutic Activity                  [x]? Pain Management with/without modalities PRN                 [x]?  Manual Therapy                      []? Splinting                                   [x]? Scar Management                   []?Joint Protection/Training  []? Ergonomics                             []? Joint Mobilization                      []? Adaptive Equipment Assessment/Training                             [x]?  Manual Edema Mobilization   []? Myofascial Release                 []? Energy Conservation/Work Simplification  [x]? GM/FM Coordination                []? Safety retraining/education per  individual diagnosis/goals  [x]? Desensitization        Patient Specific Goal: \" To get better and get back to work. \"                             GOALS (Long term same as Short term): updated 2-21-22  1) Patient will demonstrate good understanding of home program (exercises/activities/diagnosis/prognosis/goals) with good accuracy. Goal in progress. 2) Patient will demonstrate increased active/passive range of motion of their R thumb/ wrist to [facility] for ADL/IADL completion. Goal making good gains. 3) Patient will demonstrate  /pinch strength of at least 50# / 5 pinch pounds of their R hand. Goal recently initiated. Jennifer Guillaume 4) Patient to report decreased pain in their affected r distal upper extremity from 4-6/10 with light movement  to 3/10 or less with resistive functional use. Goal making fair progress. 5) Increase in fine motor function as evidenced by <25 sec to complete 9-hole peg test with the right hand. Goal making fair progress.    6) Patient to report a decrease in hypersensitivity from 90% to 20% or less in their R thumb. Goal making fair progress. Hypersensitivity decreased to 70% of the time. 7) Patient to demonstrate decreased guarding of their affected extremity from 75% to 20% or less. Goal making fair gains. Pt is guarding about 50% of the time. 8) Patient will be knowledgeable of edema control techniques as evident with decreases from moderate to mild/none. Goal making good gains. 9) Patient will demonstrate a non-tender/non-adherent scar. Goal making good gains. 10) Patient will report ADL/ IADL functions as I using the right thumb/ hand. Goal making fair gains. 11) Patient will decrease QuickDASH score to 30% or less for increased participation in daily functional activities. Goal making slow gains.      Pain Level: No pain reported    Subjective: \" I hurt my thumb a couple times. I was using it and really not thinking about it. \"    Objective:  Updated POC to be completed by 4-21-22. INTERVENTION: COMPLETED: SPECIFICS/COMMENTS:   Modality:     Fluidotherapy R hand with AROM- low heat   10 min for desensitization and to decrease joint stiffness   MH R hand- x 5 min x Used today due to cuts on the thumb tip   AROM:     Wrist AROM x - AROM all planes   Thumb AROM X                x  x   - thumb AROM ( extension, ABD, MP flexion)  - opposition ex- using marbles - using RF and SF, did 15 with  pieces to RF - too hard to SF  - over extend upon release.   - Dig into bag for discs this date for desensitization also. - xsmall screw/ washer bolt assembly/ ^5x on and off   -towel task with thumb- ^20 reps   -Exercise balls CLW   Coordination  ex     FM tasks         x           -gentle opposition ex with Bunchems balls 10 pieces   - bunchems hold in hand and place 1 at a time in in hand translation  - [employer] pegboard assembly- in manual/ out with tweezers  -tweezers -  buttons - 10 lateral pinch but not on his sore spot and kristopher 3 for 3 pt pinch.      -opposition with small beads and thread on string        PROM/Stretching:     Thumb PROM X   ABD/ADD, ext, MP flexion , opposition to the small finger     Palmar WB with thumb in radial ext  Gentle stretches   Scar Mass/Edema Control:     Retrograde massage X    x     - R radial hand/ palm- beginning and end of session  -x lg gel finger sleeve - using in the evenings          Strengthening:     Hand strengthening                       x   - gentle gripping/ xsoft putty  - velcro board - small lateral pinch - 5x, large roller with handle- 5x and  pinch and pull of tabs  - lateral pinch - removal of 7 dimes from soft putty.   -Hand gripper- rung 4 2-20  - julia bar 15# green- 20x^ twist in/ out and bending  - red  clothespins --  pom poms   2 sets of 15 lateral and/  15  3 pt pinch   - xsoft putty with tools/ lid/ hook/ key    - yellow (soft) theraputty- placing discs- 3x         Other:     Desensitization  - beans and rice bins- removing marbles with vision occluded. , tactile sensory ex,  Fluidotherapy. Rice bin   Work simplification- discussed possible modifications  - cutting vinyl trimming  - using a broom  - applying cardboard sleeve and staple     Handwriting/ functional tasks        - red tubing provided to help to ease prehension needed for writing.   - discussed importance of writing tool size  - folding towels encouraging pinching with R  Hand - 5 towels. Assessment/Comments: Pt is making Good+ progress toward stated plan of care. Pt presents with a minor cut and minor blister on the affected thumb. Tx adjusted to avoid friction to the affected areas. Overall use of the thumb is getting more spontaneous and improved.      -Rehab Potential: Good  -Requires OT Follow Up: Yes    Time In:  1300         Time Out: 1350                     Visit #:5    Treatment Charges: Mins Time in - Time out  Units   Modalities: FL      Ther Exercise 15 3557-1848 1   Manual Therapy      Thera Activities 30 8399-7356 2   ADL/Home Mgt Neuro Re-education      Gait Training      Group Therapy      Non-Billable Service Time 5 [id number]    Other      Total Time/Units 50  3       -Response to Treatment: Pt is happy with his progress. Pt states he is back to doing his normal activities at work. He reports the thumb does swell and ache at the end of his work day. Will monitor. Goals: Goals for pt can be seen on initial eval occurring on 1-19-22 and re assessment on 2/21/22. Hold re-evaluation until 4-21-22 due to break in Tx due to C9. Plan:   [x]  Continue Plan of care:   Continue ROM, edema control, desensitization, FM tasks, and resistive strengthening as tolerated. Treatment covered based on POC and graduated to patient's progress. Pt education continues at each visit to obtain maximum benefits from skilled OT intervention.   []  [facility] of care:   []  Discharge:      Julissa Simeon, OT/L, [id number]

## 2022-04-04 ENCOUNTER — TREATMENT (OUTPATIENT)
Dept: OCCUPATIONAL THERAPY | Age: 27
End: 2022-04-04
Payer: COMMERCIAL

## 2022-04-04 DIAGNOSIS — S68.511A COMPLETE TRAUMATIC AMPUTATION OF RIGHT THUMB THROUGH PHALANX, INITIAL ENCOUNTER: Primary | ICD-10-CM

## 2022-04-04 PROCEDURE — 97110 THERAPEUTIC EXERCISES: CPT | Performed by: OCCUPATIONAL THERAPY ASSISTANT

## 2022-04-04 PROCEDURE — 97530 THERAPEUTIC ACTIVITIES: CPT | Performed by: OCCUPATIONAL THERAPY ASSISTANT

## 2022-04-04 PROCEDURE — 97022 WHIRLPOOL THERAPY: CPT | Performed by: OCCUPATIONAL THERAPY ASSISTANT

## 2022-04-04 NOTE — PROGRESS NOTES
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 Edema control, modalities prn  Frequency 3x week for 6 weeks      Assessment of current deficits   []? Functional mobility             [x]? ADLs          [x]? Strength                  []? Cognition   []? Functional transfers           [x]? IADLs         []? Safety Awareness  []? Endurance   [x]? Fine Motor Coordination    []? Balance      []? Vision/perception   [x]? Sensation     []? Gross Motor Coordination [x]? ROM           [x]? Pain                        [x]? Edema          [x]? Scar Adhesion/Skin Integrity      OT PLAN OF CARE   OT POC based on physician orders, patient diagnosis and results of clinical assessment     Frequency/Duration: 2-3x/ week x 6 weeks  Specific OT Treatment to include     [x]? Instruction in HEP                   Modalities:  [x]?  Therapeutic Exercise                 [x]? Ultrasound               []? Electrical Stimulation/Attended  [x]? PROM/Stretching                    [x]? Fluidotherapy          [x]?   Paraffin                   [x]? AAROM  [x]?  AROM                 []? Iontophoresis:   []? Tendon Jobie Sports  []? Neuromuscular Re-Ed            []? ADL/IADL re-training    [x]?  Therapeutic Activity                  [x]? Pain Management with/without modalities PRN                 [x]?  Manual Therapy                      []? Splinting                                   [x]? Scar Management                   []?Joint Protection/Training  []? Ergonomics                             []? Joint Mobilization                      []? Adaptive Equipment Assessment/Training                             [x]?  Manual Edema Mobilization   []? Myofascial Release                 []? Energy Conservation/Work Simplification  [x]? GM/FM Coordination                []? Safety retraining/education per  individual diagnosis/goals  [x]? Desensitization        Patient Specific Goal: \" To get better and get back to work. \"                             GOALS (Long term same as Short term): updated 2-21-22  1) Patient will demonstrate good understanding of home program (exercises/activities/diagnosis/prognosis/goals) with good accuracy. Goal in progress. 2) Patient will demonstrate increased active/passive range of motion of their R thumb/ wrist to [facility] for ADL/IADL completion. Goal making good gains. 3) Patient will demonstrate  /pinch strength of at least 50# / 5 pinch pounds of their R hand. Goal recently initiated. Evelyn Chandler 4) Patient to report decreased pain in their affected r distal upper extremity from 4-6/10 with light movement  to 3/10 or less with resistive functional use. Goal making fair progress. 5) Increase in fine motor function as evidenced by <25 sec to complete 9-hole peg test with the right hand. Goal making fair progress.    6) Patient to report a decrease in hypersensitivity from 90% to 20% or less in their R thumb. Goal making fair progress. Hypersensitivity decreased to 70% of the time. 7) Patient to demonstrate decreased guarding of their affected extremity from 75% to 20% or less. Goal making fair gains. Pt is guarding about 50% of the time. 8) Patient will be knowledgeable of edema control techniques as evident with decreases from moderate to mild/none. Goal making good gains. 9) Patient will demonstrate a non-tender/non-adherent scar. Goal making good gains. 10) Patient will report ADL/ IADL functions as I using the right thumb/ hand. Goal making fair gains. 11) Patient will decrease QuickDASH score to 30% or less for increased participation in daily functional activities. Goal making slow gains.      Pain Level: No pain reported    Subjective: Pt. Presented with no new complaints or comments. Objective:  Updated POC to be completed by 4-21-22. INTERVENTION: COMPLETED: SPECIFICS/COMMENTS:   Modality:     Fluidotherapy R hand with AROM- low heat   10 min for desensitization and to decrease joint stiffness   MH R hand- x 5 min x Used today due to cuts on the thumb tip   AROM:     Wrist AROM x - AROM all planes   Thumb AROM X                x  x   - thumb AROM ( extension, ABD, MP flexion)  - opposition ex- using marbles - using RF and SF, did 15 with  pieces to RF - too hard to SF  - over extend upon release.   - Dig into bag for discs this date for desensitization also. - xsmall screw/ washer bolt assembly/ ^5x on and off   -towel task with thumb- ^20 reps   -Exercise balls CLW   Coordination  ex     FM tasks x    x               -gentle opposition ex with Bunchems balls 10 pieces   - bunchems hold in hand and place 1 at a time in in hand translation  - [employer] pegboard assembly- in manual/ out with tweezers  -tweezers -  buttons - 10 lateral pinch but not on his sore spot and kristopher 3 for 3 pt pinch.      -opposition with small beads and thread on string PROM/Stretching:     Thumb PROM X   ABD/ADD, ext, MP flexion , opposition to the small finger     Palmar WB with thumb in radial ext  Gentle stretches   Scar Mass/Edema Control:     Retrograde massage X    x     - R radial hand/ palm- beginning and end of session  -x lg gel finger sleeve - using in the evenings          Strengthening:     Hand strengthening             x      x    x   - gentle gripping/ xsoft putty  - velcro board - small lateral pinch - 5x, large roller with handle- 5x and  pinch and pull of tabs  - lateral pinch - removal of 7 dimes from soft putty.   -Hand gripper- rung 5^ 2-20  - julia bar 15# green- 20x twist in/ out and bending  - red  clothespins --  pom poms   2 sets of 15 lateral and/  15  3 pt pinch   - xsoft putty with tools/ lid/ hook/ key    - yellow (soft) theraputty- placing discs- 3x         Other:     Desensitization  - beans and rice bins- removing marbles with vision occluded. , tactile sensory ex,  Fluidotherapy. Rice bin   Work simplification- discussed possible modifications  - cutting vinyl trimming  - using a broom  - applying cardboard sleeve and staple     Handwriting/ functional tasks        - red tubing provided to help to ease prehension needed for writing.   - discussed importance of writing tool size  - folding towels encouraging pinching with R  Hand - 5 towels. Assessment/Comments: Pt is making Good+ progress toward stated plan of care. Pt. Tolerated all exercises and stretches today. Pt. Is utilizing R thumb for almost all activities at this point. Will continue to advance as tolerated.      -Rehab Potential: Good  -Requires OT Follow Up: Yes    Time In:  1505         Time Out: 1600                   Visit #:6    Treatment Charges: Mins Time in - Time out  Units   Modalities: FL 10  1   Ther Exercise 15  1   Manual Therapy      Thera Activities 30  2   ADL/Home Mgt       Neuro Re-education      Gait Training      Group Therapy      Non-Billable Service Time      Other      Total Time/Units 55  4       -Response to Treatment: Pt is happy with his progress. Pt states he is back to doing his normal activities at work. He reports the thumb does swell and ache at the end of his work day. Will monitor. Goals: Goals for pt can be seen on initial eval occurring on 1-19-22 and re assessment on 2/21/22. Hold re-evaluation until 4-21-22 due to break in Tx due to C9. Plan:   [x]  Continue Plan of care:   Continue ROM, edema control, desensitization, FM tasks, and resistive strengthening as tolerated. Treatment covered based on POC and graduated to patient's progress. Pt education continues at each visit to obtain maximum benefits from skilled OT intervention.   []  Alter Plan of care:   []  Discharge:      Greg Wynn 82, [id number]

## 2022-04-11 ENCOUNTER — TREATMENT (OUTPATIENT)
Dept: OCCUPATIONAL THERAPY | Age: 27
End: 2022-04-11
Payer: COMMERCIAL

## 2022-04-11 DIAGNOSIS — S68.511A COMPLETE TRAUMATIC AMPUTATION OF RIGHT THUMB THROUGH PHALANX, INITIAL ENCOUNTER: Primary | ICD-10-CM

## 2022-04-11 PROCEDURE — 97530 THERAPEUTIC ACTIVITIES: CPT | Performed by: OCCUPATIONAL THERAPY ASSISTANT

## 2022-04-11 PROCEDURE — 97110 THERAPEUTIC EXERCISES: CPT | Performed by: OCCUPATIONAL THERAPY ASSISTANT

## 2022-04-11 PROCEDURE — 97022 WHIRLPOOL THERAPY: CPT | Performed by: OCCUPATIONAL THERAPY ASSISTANT

## 2022-04-11 NOTE — PROGRESS NOTES
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 Edema control, modalities prn  Frequency 3x week for 6 weeks      Assessment of current deficits   []? Functional mobility             [x]? ADLs          [x]? Strength                  []? Cognition   []? Functional transfers           [x]? IADLs         []? Safety Awareness  []? Endurance   [x]? Fine Motor Coordination    []? Balance      []? Vision/perception   [x]? Sensation     []? Gross Motor Coordination [x]? ROM           [x]? Pain                        [x]? Edema          [x]? Scar Adhesion/Skin Integrity      OT PLAN OF CARE   OT POC based on physician orders, patient diagnosis and results of clinical assessment     Frequency/Duration: 2-3x/ week x 6 weeks  Specific OT Treatment to include     [x]? Instruction in HEP                   Modalities:  [x]?  Therapeutic Exercise                 [x]? Ultrasound               []? Electrical Stimulation/Attended  [x]? PROM/Stretching                    [x]? Fluidotherapy          [x]?   Paraffin                   [x]? AAROM  [x]?  AROM                 []? Iontophoresis:   []? Tendon Conover Dancer  []? Neuromuscular Re-Ed            []? ADL/IADL re-training    [x]?  Therapeutic Activity                  [x]? Pain Management with/without modalities PRN                 [x]?  Manual Therapy                      []? Splinting                                   [x]? Scar Management                   []?Joint Protection/Training  []? Ergonomics                             []? Joint Mobilization                      []? Adaptive Equipment Assessment/Training                             [x]?  Manual Edema Mobilization   []? Myofascial Release                 []? Energy Conservation/Work Simplification  [x]? GM/FM Coordination                []? Safety retraining/education per  individual diagnosis/goals  [x]? Desensitization        Patient Specific Goal: \" To get better and get back to work. \"                             GOALS (Long term same as Short term): updated 2-21-22  1) Patient will demonstrate good understanding of home program (exercises/activities/diagnosis/prognosis/goals) with good accuracy. Goal in progress. 2) Patient will demonstrate increased active/passive range of motion of their R thumb/ wrist to [facility] for ADL/IADL completion. Goal making good gains. 3) Patient will demonstrate  /pinch strength of at least 50# / 5 pinch pounds of their R hand. Goal recently initiated. Danae Wilton 4) Patient to report decreased pain in their affected r distal upper extremity from 4-6/10 with light movement  to 3/10 or less with resistive functional use. Goal making fair progress. 5) Increase in fine motor function as evidenced by <25 sec to complete 9-hole peg test with the right hand. Goal making fair progress.    6) Patient to report a decrease in hypersensitivity from 90% to 20% or less in their R thumb. Goal making fair progress. Hypersensitivity decreased to 70% of the time. 7) Patient to demonstrate decreased guarding of their affected extremity from 75% to 20% or less. Goal making fair gains. Pt is guarding about 50% of the time. 8) Patient will be knowledgeable of edema control techniques as evident with decreases from moderate to mild/none. Goal making good gains. 9) Patient will demonstrate a non-tender/non-adherent scar. Goal making good gains. 10) Patient will report ADL/ IADL functions as I using the right thumb/ hand. Goal making fair gains. 11) Patient will decrease QuickDASH score to 30% or less for increased participation in daily functional activities. Goal making slow gains.      Pain Level: No pain reported    Subjective: Pt. Presented with no new complaints or comments. Objective:  Updated POC to be completed by 4-21-22. INTERVENTION: COMPLETED: SPECIFICS/COMMENTS:   Modality:     Fluidotherapy R hand with AROM- low heat x  10 min for desensitization and to decrease joint stiffness   MH R hand- x 5 min  Used today due to cuts on the thumb tip   AROM:     Wrist AROM x - AROM all planes   Thumb AROM X                x  x   - thumb AROM ( extension, ABD, MP flexion)  - opposition ex- using marbles - using RF and SF, did 15 with  pieces to RF - too hard to SF  - over extend upon release.   - Dig into bag for discs this date for desensitization also.    - xsmall screw/ washer bolt assembly/ ^5x on and off   -towel task with thumb-20 reps   -Exercise balls CLW   Coordination  ex     FM tasks x    x              x -gentle opposition ex with Bunchems balls 10 pieces   - bunchems hold in hand and place 1 at a time in in hand translation  - EpiEPe pegboard assembly- in manual/ out with tweezers  -tweezers -  buttons - 10 lateral pinch but not on his sore spot and kristopher 3 for 3 pt pinch.     -coin manipulation task  -opposition with small beads and thread on string        PROM/Stretching:     Thumb PROM X   ABD/ADD, ext, MP flexion , opposition to the small finger     Palmar WB with thumb in radial ext  Gentle stretches   Scar Mass/Edema Control:     Retrograde massage X    x     - R radial hand/ palm- beginning and end of session  -x lg gel finger sleeve - using in the evenings          Strengthening:     Hand strengthening             x  x    x          x - gentle gripping/ xsoft putty  - velcro board - small lateral pinch - 5x, large roller with handle- 5x and  pinch and pull of tabs  - lateral pinch - removal of 7 dimes from soft putty.   -Hand gripper- rung 5  2-20  - julia bar 15# green- 20x twist in/ out and bending  - red and green^  clothespins --  pom poms   2 sets of 15 lateral and/  15  3 pt pinch   - xsoft putty with tools/ lid/ hook/ key    - yellow (soft) theraputty- placing discs- 3x         Other:     Desensitization  - beans and rice bins- removing marbles with vision occluded. , tactile sensory ex,  Fluidotherapy. Rice bin   Work simplification- discussed possible modifications  - cutting vinyl trimming  - using a broom  - applying cardboard sleeve and staple     Handwriting/ functional tasks        - red tubing provided to help to ease prehension needed for writing.   - discussed importance of writing tool size  - folding towels encouraging pinching with R  Hand - 5 towels. Assessment/Comments: Pt is making Good+ progress toward stated plan of care. Pt. Tolerated all exercises and stretches today. Will see for 1 more session then transition to HEP.  Pt. 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     -Rehab Potential: Good  -Requires OT Follow Up: Yes    Time In:  1405         Time Out: 1500                   Visit #:7    Treatment Charges: Mins Time in - Time out  Units   Modalities: FL 10 8253-6585 1   Ther Exercise 15 [id number] 1   Manual Therapy      Thera Activities 30 [id number] 2   ADL/Home Mgt       Neuro Re-education      Gait Training      Group Therapy      Non-Billable Service Time      Other      Total Time/Units 55  4       -Response to Treatment: Pt is happy with his progress. Pt states he is back to doing his normal activities at work. He reports the thumb does swell and ache at the end of his work day. Will monitor. Goals: Goals for pt can be seen on initial eval occurring on 1-19-22 and re assessment on 2/21/22. Hold re-evaluation until 4-21-22 due to break in Tx due to C9. Plan:   [x]  Continue Plan of care:   Continue ROM, edema control, desensitization, FM tasks, and resistive strengthening as tolerated. Treatment covered based on POC and graduated to patient's progress. Pt education continues at each visit to obtain maximum benefits from skilled OT intervention.   []  Alter Plan of care:   []  Discharge:      Arde[place], [facility], [id number]

## 2023-05-10 NOTE — PLAN OF CARE
Edgar Moreno is a 48 year old male.  Chief Complaint   Patient presents with   • Office Visit     Patient is here for follow up; Patient has constant pain in lower back, lower right leg ; worse with activity or sitting still; Denies ER visit/fall within last 2 weeks. UDS ordered, patient informed.   • Medication Management     Norco LD  this AM; alleviates  50 % pain;  lasts for  4-6 hours   Morphine LD this AM;  alleviates  80 % pain;  lasts for  8-10 hours      • Refill Request     Patient seeking refill of  [place], Morphine SR   • Pain     Pain 8/10       5/10/2023 In Office Visit Assessment:  Assessment   Problem List Items Addressed This Visit        Hematology and Neoplasia    Chronic lymphocytic leukemia (CLL) genetic mutation variant (CMD)       Mental Health    Chronic, continuous use of opioids       Musculoskeletal and Injuries    Tibia fracture right       Neuro    Chronic pain syndrome    Neuropathic pain   Other Visit Diagnoses     Cervical spinal stenosis              5/10/2023 In Office Visit Plan :     Norco 10/325 4 times a day #90 minimum 22-day supply last refilled 4/19/23; E-prescribed release 1 day early 5/18 and 6/17. Opioid gives 80% pain relief, duration relief 10hrs.    MS Contin 15 mg twice daily last refilled 4/19/2023; E-prescribed release 1 day early 5/18 and 6/17    Neuropathic pain: Lyrica 75 mg 3 times a day    CLL: diagnosed around had a bleeding ulcer, found to have CLL, monitored every 6 months.  Sees Dr. Larios    4/23/2019 right iliac bone marrow aspiration and core biopsy for cytopenia.  11/16/2018 right axillary lymph node ultrasound-guided biopsy     Occupation: Current job installing windows at a hotel.    Return in about 2 months (around 7/10/2023).      5/10/2023 Follow Up Pain Management Office Visit:  Questions/concerns today's visit: refill(s)   Pain location(s): right leg and low back  Current Verbal Pain Score: 7/10   Pain Description: achy, sharp and stabbing 
Problem: Skin Integrity:  Goal: Will show no infection signs and symptoms  Description: Will show no infection signs and symptoms  12/9/2021 0834 by Ean Hoang RN  Outcome: Met This Shift     Problem: Skin Integrity:  Goal: Absence of new skin breakdown  Description: Absence of new skin breakdown  12/9/2021 0834 by Ean Hoang RN  Outcome: Met This Shift     Problem: Falls - Risk of:  Goal: Will remain free from falls  Description: Will remain free from falls  12/9/2021 0834 by Ean Hoang RN  Outcome: Met This Shift     Problem: Falls - Risk of:  Goal: Absence of physical injury  Description: Absence of physical injury  12/9/2021 0834 by Ean Hoang RN  Outcome: Met This Shift     Problem: Pain:  Goal: Pain level will decrease  Description: Pain level will decrease  12/9/2021 0834 by Ean Hoang RN  Outcome: Met This Shift     Problem: Pain:  Goal: Control of acute pain  Description: Control of acute pain  Outcome: Met This Shift     Problem: Tissue Perfusion:  Goal: Peripheral tissue perfusion will improve  Description: Peripheral tissue perfusion will improve  Outcome: Met This Shift
Problem: Skin Integrity:  Goal: Will show no infection signs and symptoms  Description: Will show no infection signs and symptoms  Outcome: Met This Shift  Goal: Absence of new skin breakdown  Description: Absence of new skin breakdown  Outcome: Met This Shift     Problem: Falls - Risk of:  Goal: Absence of physical injury  Description: Absence of physical injury  Outcome: Met This Shift     Problem: Pain:  Goal: Pain level will decrease  Description: Pain level will decrease  Outcome: Not Met This Shift  Goal: Control of acute pain  Description: Control of acute pain  Outcome: Not Met This Shift
  Pain duration: intermittent   Radiating pain: low back radiating to right leg    Last Note(s)   3/15/2023 Screenings:   Opioid Risk Score:  0  Opioid Risk Category: Low Risk    0-3 low risk; 4-7 medium risk; 8 or higher high risk      Last Note(s)  2023 In Office Visit Plan :      Norco 10/325 3-4 times a day last filled 2022.  Pain relief 60%, lasting for several hours.  E-prescribed 1 day early 2023 and 2023  Morphine sulfate continuous 15 mg p.o. twice daily last filled 2022 e-prescribed release 2023 and 2023     Patient's medical marijuana card will be expiring 2023.  Patient has been followed by oncology for his CLL.  I encouraged him to follow-up with them for renewal for his medical marijuana card.  Return in about 2 months (around 3/18/2023).        2022 In video visit  Plan :    History of worker's comp injection(s)  suffering a right tib-fib fractures s/p repair Dr. Abdirahman [place], [place]   Medical marijuana card:pt uses the flower. Helps to relax and also helps with pain.  Pain location: right tibia and lumbar region.    Imagin2017 MRI [facility] the lumbar spine at [facility] radiology report available.  L4-L5 disc bulge with broad-based bulging of the left paracentral region.  Mild central canal stenosis.  L5-S1 disc bulge cause mild central canal stenosis.  Moderately severe left neural foraminal stenosis and moderate right foraminal stenosis.  No significant arthritis in the back.          6/15/2022 In Office Visit Plan :      Norco  10mg #90 release 22. Pain relief 20-30% norco lasting 8-10 hrs.  mscontin 15mg po bid #60 release 22.  Occupation:  Construction; working in [employer].  CLL: no meds, seeing Dr. Ric Orourke. Will obtain records.              Interventional pain management   10/20/2020 left L4-L5 and left L5-S1 transforaminal epidural steroid injections local 
and alter actions that are detrimental to health will improve  Description: Ability to identify and alter actions that are detrimental to health will improve  Outcome: Met This Shift     Problem: Tissue Perfusion:  Goal: Peripheral tissue perfusion will improve  Description: Peripheral tissue perfusion will improve  12/10/2021 0700 by Abraham Juares RN  Outcome: Met This Shift  12/10/2021 0015 by Jesus Landeros  Outcome: Ongoing  12/9/2021 2049 by Jesus Landeros  Outcome: Ongoing
anesthetic                               5/6/2020 Plan: [facility] new patient consult   1. Patient currently on mscontin 30mg po bid and norco po tid prn. I recommend average 60 morphine miligram equivalents. E-prescribed mscontin 15mg po bid and norco 3-4 times a day. Patient has 3 pills norco  Left.     2. Obtain recommend gail lam seen for 2 months prior to clinic closing. Patient followed by Dr. Valentina Faustin since 2005.  3. History of worker's comp injection(s) 2005 suffering a right tib-fib fractures s/p repair Dr. Abdirahman La[place], [place]   4. Patient has medical marijuana card.  5. Obtain records from Gail Lam and Dr. Faustin.  6. Consider neuropathic pain meds in the future.  Return in about 1 month (around 6/6/2020).       7/12/2019 X-rays of the right tibia-fibula  shows there are postsurgical changes for fibular fracture with intramedullary amelia attached with screws.  No evidence of acute fracture dislocation or destructive bony lesions.     5/6/2020 New Patient [facility] Pain Management Office Visit:  1. Previously saw 12/2020-2/2020, saw Dr. Valentina Faustin since 2005, on mscontin for low back pain and sciatica. 2012 worker's comp injury suffered tibia fibular fx, 30mg po bid. Works as a union . Getting on the ladder. Fell 25 feet. Dr. Abdirahman Lara orthopedic surgeon performed the surgery. [facility]. Closed 2018. Patient previously worked as an . Currently working with welding steel. Originally was on medications for low back pain and sciatica. Received injection(s) from Dr. Faustin.     Medical marijuana card. Uses the flower. Using a 1-1.5 ounces.       [place] Prescription Monitoring Program reviewed: 5/10/2023  Medications were reviewed: 5/10/2023  5/10/2023  Outpatient Medications Marked as Taking for the 5/10/23 encounter (Office Visit) with Jb Cheek MD   Medication Sig Dispense Refill   • morphine SR (MS Contin) 15 MG 12 hr tablet Take 1 tablet 
by mouth in the morning and 1 tablet in the evening. Do not start before April 19, 2023. 60 tablet 0   • morphine SR (MS CONTIN) 15 MG 12 hr tablet Take 1 tablet by mouth in the morning and 1 tablet in the evening. Do not start before March 20, 2023. 60 tablet 0   • HYDROcodone-acetaminophen (NORCO)  MG per tablet Do not start before April 19, 2023. 1 tab po tid-qid prn pain 90 tablet 0   • HYDROcodone-acetaminophen (NORCO)  MG per tablet Do not start before March 20, 2023. 1 tab po tid-qid prn pain. 90 tablet 0   • pregabalin (LYRICA) 75 MG capsule 1 tab po tid. Watch for dizziness drowsiness 90 capsule 2   • lisinopril (ZESTRIL) 20 MG tablet      • hydrochlorothiazide (HYDRODIURIL) 25 MG tablet         ALLERGIES:  No Known Allergies  Past Medical History:   Diagnosis Date   • Chronic pain    • CLL (chronic lymphocytic leukemia) (CMD) 01/01/2018   • HTN (hypertension)    • Neuropathy     Left leg   • Sciatica      Past Surgical History:   Procedure Laterality Date   • Inj transforam epidural lumbar/sacral  10/20/2020        • Leg surgery Right     tibia fibula fx with rods, pins & screws in right leg after fall from ladder     Social History     Tobacco Use   Smoking Status Every Day   • Current packs/day: 1.50   • Types: Cigarettes   Smokeless Tobacco Never   Vaping Use   Vaping Status never used     Social History     Substance and Sexual Activity   Alcohol Use Never       Drug Use:    Yes                Special: Marijuana       Comment: smokes joints (10 grams/month)     Review of Systems   Constitutional: Negative for chills and fever.   Respiratory: Negative for cough and shortness of breath.        Physical Exam:   blood pressure is 154/90 (abnormal).   Physical Exam  Constitutional:       Appearance: He is well-developed.   HENT:      Head: Normocephalic and atraumatic.   Cardiovascular:      Rate and Rhythm: Normal rate and regular rhythm.   Pulmonary:      Effort: Pulmonary effort is normal.    
  Breath sounds: Normal breath sounds.   Musculoskeletal:         General: No deformity.      Comments: Ambulating with straight cane    Neurological:      Mental Status: He is alert and oriented to person, place, and time.      Comments: Speech fluent, no slurred speech.           Labs:    Assessment & Plan located top of document  This note was made using voice dictation and may include inadvertent errors due to the dictation software. Please contact for clarification regarding any noted discrepancies.

## (undated) DEVICE — GOWN,SIRUS,FABRNF,2XL,18/CS: Brand: MEDLINE

## (undated) DEVICE — SURGICAL PROCEDURE PACK BASIC

## (undated) DEVICE — CRADLE ARM W8.75XH12.5XL16IN FOAM SUPP ELEVATION VENT

## (undated) DEVICE — SINGLE USE DEVICE INTENDED TO COVER EXPOSED ENDS OF ORTHOPEDIC PIN AND K-WIRES TO HELP PROTECT THE EXPOSED WIRE FROM SNAGGING ON CLOTHING.: Brand: OXBORO™ PIN COVER

## (undated) DEVICE — CLIP LIG M BLU TI HRT SHP WIRE HORZ 600 PER BX

## (undated) DEVICE — TOWEL,OR,DSP,ST,BLUE,STD,6/PK,12PK/CS: Brand: MEDLINE

## (undated) DEVICE — SET IRR L100IN DIA0.280IN C100ML 2 NVENT PIERCING PINS 3

## (undated) DEVICE — TRAY PROCED PLASTIC CUSTOME SOFT TISS

## (undated) DEVICE — PADDING UNDERCAST W4INXL4YD COT FBR LO LINTING WYTEX

## (undated) DEVICE — GLOVE SURG SZ 6 THK91MIL LTX FREE SYN POLYISOPRENE ANTI

## (undated) DEVICE — DOUBLE BASIN SET: Brand: MEDLINE INDUSTRIES, INC.

## (undated) DEVICE — SYRINGE MED 10ML LUERLOCK TIP W/O SFTY DISP

## (undated) DEVICE — WET SKIN PREP TRAY: Brand: MEDLINE INDUSTRIES, INC.

## (undated) DEVICE — SOLUTION IRRIG 1000ML 0.9% SOD CHL USP POUR PLAS BTL

## (undated) DEVICE — TOTAL TRAY, DB, 100% SILI FOLEY, 16FR 10: Brand: MEDLINE

## (undated) DEVICE — COUNTER NDL W1.5XL2.5IN 10 COUNT

## (undated) DEVICE — SPONGE,LAP,4"X18",XR,ST,5/PK,40PK/CS: Brand: MEDLINE INDUSTRIES, INC.

## (undated) DEVICE — BNDG,ELSTC,MATRIX,STRL,3"X5YD,LF,HOOK&LP: Brand: MEDLINE

## (undated) DEVICE — SURGICAL PROCEDURE PACK HND

## (undated) DEVICE — 4-PORT MANIFOLD: Brand: NEPTUNE 2

## (undated) DEVICE — GLOVE ORANGE PI 8 1/2   MSG9085

## (undated) DEVICE — CLOTH SURG PREP PREOPERATIVE CHLORHEXIDINE GLUC 2% READYPREP

## (undated) DEVICE — 1.5L THIN WALL CAN: Brand: CRD

## (undated) DEVICE — Z DISCONTINUED USE 2272117 DRAPE SURG 3 QTR N INVASIVE 2 LAYR DISP

## (undated) DEVICE — SET ORTHO MINI IMPL FRAG

## (undated) DEVICE — ZIMMER® STERILE DISPOSABLE TOURNIQUET CUFF WITH PLC, DUAL PORT, SINGLE BLADDER, 18 IN. (46 CM)

## (undated) DEVICE — 1000 S-DRAPE TOWEL DRAPE 10/BX: Brand: STERI-DRAPE™

## (undated) DEVICE — DRESSING,GAUZE,XEROFORM,CURAD,5"X9",ST: Brand: CURAD

## (undated) DEVICE — READY WET SKIN SCRUB TRAY-LF: Brand: MEDLINE INDUSTRIES, INC.

## (undated) DEVICE — STANDARD HYPODERMIC NEEDLE,POLYPROPYLENE HUB: Brand: MONOJECT

## (undated) DEVICE — 3M™ COBAN™ NL STERILE NON-LATEX SELF-ADHERENT WRAP, 2084S, 4 IN X 5 YD (10 CM X 4,5 M), 18 ROLLS/CASE: Brand: 3M™ COBAN™

## (undated) DEVICE — DRILL SYSTEM 7

## (undated) DEVICE — ZIMMER® STERILE DISPOSABLE TOURNIQUET CUFF WITH PROTECTIVE SLEEVE AND PLC, DUAL PORT, SINGLE BLADDER, 18 IN. (46 CM)

## (undated) DEVICE — SOLUTION IRRIG 1000ML STRL H2O USP PLAS POUR BTL

## (undated) DEVICE — DRAPE,REIN 53X77,STERILE: Brand: MEDLINE

## (undated) DEVICE — SOLUTION SCRB 3% CHLOROXYLENOL BLU ANTIMIC SKIN CLN

## (undated) DEVICE — GOWN,SIRUS,FABRNF,XL,20/CS: Brand: MEDLINE

## (undated) DEVICE — BASIC SINGLE BASIN 1-LF: Brand: MEDLINE INDUSTRIES, INC.

## (undated) DEVICE — CANNULA INJ L2.5IN BLNT TIP 3MM CLR BODY W/ 1 W VLV DLP

## (undated) DEVICE — TUBING SUCT 12FR MAL ALUM SHFT FN CAP VENT UNIV CONN W/ OBT

## (undated) DEVICE — 3M™ STERI-DRAPE™ U-DRAPE 1015: Brand: STERI-DRAPE™

## (undated) DEVICE — GLOVE SURG SZ 9 L12IN FNGR THK13MIL WHT ISOLEX POLYISOPRENE

## (undated) DEVICE — SOLUTION IV IRRIG POUR BRL 0.9% SODIUM CHL 2F7124

## (undated) DEVICE — DRAPE EQUIP MICSCP OH2

## (undated) DEVICE — PRECISION THIN, OFFSET (5.5 X 0.38 X 25.0MM)

## (undated) DEVICE — GLOVE SURG SZ 65 THK91MIL LTX FREE SYN POLYISOPRENE

## (undated) DEVICE — SPONGE EYE L7CM NAT CELOS SPEAR VISITEC VISISPEAR

## (undated) DEVICE — SPONGE LAP W18XL18IN WHT COT 4 PLY FLD STRUNG RADPQ DISP ST

## (undated) DEVICE — COVER HNDL LT DISP

## (undated) DEVICE — INTENDED FOR TISSUE SEPARATION, AND OTHER PROCEDURES THAT REQUIRE A SHARP SURGICAL BLADE TO PUNCTURE OR CUT.: Brand: BARD-PARKER ® STAINLESS STEEL BLADES

## (undated) DEVICE — DRAPE THER FLUID WARMING 66X44 IN FLAT SLUSH DBL DISC ORS

## (undated) DEVICE — HAND SET

## (undated) DEVICE — DRAPE,HAND,STERILE: Brand: MEDLINE

## (undated) DEVICE — Device

## (undated) DEVICE — ELECTRODE PT RET AD L9FT HI MOIST COND ADH HYDRGEL CORDED

## (undated) DEVICE — TOWEL,OR,DSP,ST,BLUE,DLX,10/PK,8PK/CS: Brand: MEDLINE

## (undated) DEVICE — FEEDING TUBE • RADIOPAQUE: Brand: ARGYLE

## (undated) DEVICE — CLIP LIG SM TI 6 BLU HNDL FOR OPN AND ENDOSCP SGL APPL

## (undated) DEVICE — APPLICATOR MEDICATED 26 CC SOLUTION HI LT ORNG CHLORAPREP

## (undated) DEVICE — STANDARD HYPODERMIC NEEDLE,ALUMINUM HUB: Brand: MONOJECT

## (undated) DEVICE — CONVERTORS STOCKINETTE: Brand: CONVERTORS

## (undated) DEVICE — SYSTEM TPS ORTHO

## (undated) DEVICE — SOLUTION IRRIG 3000ML 0.9% SOD CHL USP UROMATIC PLAS CONT

## (undated) DEVICE — CORD,CAUTERY,BIPOLAR,STERILE: Brand: MEDLINE

## (undated) DEVICE — TRAY SET HAND REUSABLE

## (undated) DEVICE — DRAPE,U/ SHT,SPLIT,PLAS,STERIL: Brand: MEDLINE

## (undated) DEVICE — PADDING,UNDERCAST,COTTON, 3X4YD STERILE: Brand: MEDLINE

## (undated) DEVICE — SOLUTION IV IRRIG WATER 1000ML POUR BRL 2F7114

## (undated) DEVICE — BANDAGE,ELASTIC,ESMARK,STERILE,4"X9',LF: Brand: MEDLINE

## (undated) DEVICE — SYRINGE MED 10ML TRNSLUC BRL PLUNG BLK MRK POLYPR CTRL